# Patient Record
Sex: FEMALE | Race: WHITE | NOT HISPANIC OR LATINO | Employment: FULL TIME | URBAN - METROPOLITAN AREA
[De-identification: names, ages, dates, MRNs, and addresses within clinical notes are randomized per-mention and may not be internally consistent; named-entity substitution may affect disease eponyms.]

---

## 2017-01-20 ENCOUNTER — ALLSCRIPTS OFFICE VISIT (OUTPATIENT)
Dept: OTHER | Facility: OTHER | Age: 47
End: 2017-01-20

## 2017-04-10 ENCOUNTER — ALLSCRIPTS OFFICE VISIT (OUTPATIENT)
Dept: OTHER | Facility: OTHER | Age: 47
End: 2017-04-10

## 2017-06-30 ENCOUNTER — APPOINTMENT (OUTPATIENT)
Dept: LAB | Facility: HOSPITAL | Age: 47
End: 2017-06-30

## 2017-06-30 ENCOUNTER — ALLSCRIPTS OFFICE VISIT (OUTPATIENT)
Dept: OTHER | Facility: OTHER | Age: 47
End: 2017-06-30

## 2017-06-30 DIAGNOSIS — N39.0 URINARY TRACT INFECTION: ICD-10-CM

## 2017-06-30 LAB
BILIRUB UR QL STRIP: NORMAL
CLARITY UR: NORMAL
COLOR UR: YELLOW
GLUCOSE (HISTORICAL): NORMAL
HGB UR QL STRIP.AUTO: NORMAL
KETONES UR STRIP-MCNC: NORMAL MG/DL
LEUKOCYTE ESTERASE UR QL STRIP: NORMAL
NITRITE UR QL STRIP: NORMAL
PH UR STRIP.AUTO: 5 [PH]
PROT UR STRIP-MCNC: NORMAL MG/DL
SP GR UR STRIP.AUTO: 1.02
UROBILINOGEN UR QL STRIP.AUTO: 0.2

## 2017-06-30 PROCEDURE — 87086 URINE CULTURE/COLONY COUNT: CPT

## 2017-07-01 LAB — BACTERIA UR CULT: NORMAL

## 2017-07-02 ENCOUNTER — GENERIC CONVERSION - ENCOUNTER (OUTPATIENT)
Dept: OTHER | Facility: OTHER | Age: 47
End: 2017-07-02

## 2017-08-28 ENCOUNTER — ALLSCRIPTS OFFICE VISIT (OUTPATIENT)
Dept: OTHER | Facility: OTHER | Age: 47
End: 2017-08-28

## 2017-08-28 DIAGNOSIS — E66.8 OTHER OBESITY: ICD-10-CM

## 2017-08-28 DIAGNOSIS — E21.3 HYPERPARATHYROIDISM (HCC): ICD-10-CM

## 2017-08-28 DIAGNOSIS — D50.9 IRON DEFICIENCY ANEMIA: ICD-10-CM

## 2017-08-28 DIAGNOSIS — E55.9 VITAMIN D DEFICIENCY: ICD-10-CM

## 2017-08-28 LAB
BILIRUB UR QL STRIP: NEGATIVE
CLARITY UR: NORMAL
COLOR UR: CLEAR
GLUCOSE (HISTORICAL): NEGATIVE
HGB UR QL STRIP.AUTO: NEGATIVE
KETONES UR STRIP-MCNC: NEGATIVE MG/DL
LEUKOCYTE ESTERASE UR QL STRIP: NEGATIVE
NITRITE UR QL STRIP: NEGATIVE
PH UR STRIP.AUTO: 5 [PH]
PROT UR STRIP-MCNC: NEGATIVE MG/DL
SP GR UR STRIP.AUTO: 1.01
UROBILINOGEN UR QL STRIP.AUTO: 0.2

## 2017-09-29 ENCOUNTER — GENERIC CONVERSION - ENCOUNTER (OUTPATIENT)
Dept: OTHER | Facility: OTHER | Age: 47
End: 2017-09-29

## 2017-11-01 ENCOUNTER — TRANSCRIBE ORDERS (OUTPATIENT)
Dept: LAB | Facility: CLINIC | Age: 47
End: 2017-11-01

## 2017-11-01 ENCOUNTER — APPOINTMENT (OUTPATIENT)
Dept: LAB | Facility: CLINIC | Age: 47
End: 2017-11-01
Payer: COMMERCIAL

## 2017-11-01 DIAGNOSIS — E21.3 HYPERPARATHYROIDISM (HCC): ICD-10-CM

## 2017-11-01 DIAGNOSIS — E66.8 OTHER OBESITY: ICD-10-CM

## 2017-11-01 DIAGNOSIS — Z01.818 PREOP EXAMINATION: Primary | ICD-10-CM

## 2017-11-01 DIAGNOSIS — E55.9 VITAMIN D DEFICIENCY: ICD-10-CM

## 2017-11-01 DIAGNOSIS — Z01.818 PREOP EXAMINATION: ICD-10-CM

## 2017-11-01 DIAGNOSIS — D50.9 IRON DEFICIENCY ANEMIA: ICD-10-CM

## 2017-11-01 LAB
25(OH)D3 SERPL-MCNC: 30.7 NG/ML (ref 30–100)
ALBUMIN SERPL BCP-MCNC: 3.5 G/DL (ref 3.5–5)
ALP SERPL-CCNC: 46 U/L (ref 46–116)
ALT SERPL W P-5'-P-CCNC: 20 U/L (ref 12–78)
ANION GAP SERPL CALCULATED.3IONS-SCNC: 6 MMOL/L (ref 4–13)
AST SERPL W P-5'-P-CCNC: 15 U/L (ref 5–45)
BASOPHILS # BLD AUTO: 0.02 THOUSANDS/ΜL (ref 0–0.1)
BASOPHILS NFR BLD AUTO: 1 % (ref 0–1)
BILIRUB SERPL-MCNC: 1 MG/DL (ref 0.2–1)
BUN SERPL-MCNC: 17 MG/DL (ref 5–25)
CALCIUM SERPL-MCNC: 8.4 MG/DL (ref 8.3–10.1)
CHLORIDE SERPL-SCNC: 107 MMOL/L (ref 100–108)
CHOLEST SERPL-MCNC: 139 MG/DL (ref 50–200)
CO2 SERPL-SCNC: 28 MMOL/L (ref 21–32)
CREAT SERPL-MCNC: 0.77 MG/DL (ref 0.6–1.3)
EOSINOPHIL # BLD AUTO: 0.02 THOUSAND/ΜL (ref 0–0.61)
EOSINOPHIL NFR BLD AUTO: 1 % (ref 0–6)
ERYTHROCYTE [DISTWIDTH] IN BLOOD BY AUTOMATED COUNT: 12.5 % (ref 11.6–15.1)
GFR SERPL CREATININE-BSD FRML MDRD: 92 ML/MIN/1.73SQ M
GLUCOSE P FAST SERPL-MCNC: 95 MG/DL (ref 65–99)
HCT VFR BLD AUTO: 40 % (ref 34.8–46.1)
HDLC SERPL-MCNC: 64 MG/DL (ref 40–60)
HGB BLD-MCNC: 12.8 G/DL (ref 11.5–15.4)
LDLC SERPL CALC-MCNC: 68 MG/DL (ref 0–100)
LYMPHOCYTES # BLD AUTO: 1.11 THOUSANDS/ΜL (ref 0.6–4.47)
LYMPHOCYTES NFR BLD AUTO: 26 % (ref 14–44)
MCH RBC QN AUTO: 30.9 PG (ref 26.8–34.3)
MCHC RBC AUTO-ENTMCNC: 32 G/DL (ref 31.4–37.4)
MCV RBC AUTO: 97 FL (ref 82–98)
MONOCYTES # BLD AUTO: 0.35 THOUSAND/ΜL (ref 0.17–1.22)
MONOCYTES NFR BLD AUTO: 8 % (ref 4–12)
NEUTROPHILS # BLD AUTO: 2.81 THOUSANDS/ΜL (ref 1.85–7.62)
NEUTS SEG NFR BLD AUTO: 64 % (ref 43–75)
PLATELET # BLD AUTO: 259 THOUSANDS/UL (ref 149–390)
PMV BLD AUTO: 9.9 FL (ref 8.9–12.7)
POTASSIUM SERPL-SCNC: 4.5 MMOL/L (ref 3.5–5.3)
PROT SERPL-MCNC: 6.6 G/DL (ref 6.4–8.2)
RBC # BLD AUTO: 4.14 MILLION/UL (ref 3.81–5.12)
SODIUM SERPL-SCNC: 141 MMOL/L (ref 136–145)
TRIGL SERPL-MCNC: 36 MG/DL
TSH SERPL DL<=0.05 MIU/L-ACNC: 1.35 UIU/ML (ref 0.36–3.74)
WBC # BLD AUTO: 4.31 THOUSAND/UL (ref 4.31–10.16)

## 2017-11-01 PROCEDURE — 85025 COMPLETE CBC W/AUTO DIFF WBC: CPT

## 2017-11-01 PROCEDURE — 80061 LIPID PANEL: CPT

## 2017-11-01 PROCEDURE — 84443 ASSAY THYROID STIM HORMONE: CPT

## 2017-11-01 PROCEDURE — 82306 VITAMIN D 25 HYDROXY: CPT

## 2017-11-01 PROCEDURE — 80053 COMPREHEN METABOLIC PANEL: CPT

## 2017-11-01 PROCEDURE — 36415 COLL VENOUS BLD VENIPUNCTURE: CPT

## 2017-11-03 ENCOUNTER — GENERIC CONVERSION - ENCOUNTER (OUTPATIENT)
Dept: OTHER | Facility: OTHER | Age: 47
End: 2017-11-03

## 2017-11-06 RX ORDER — ALPRAZOLAM 0.25 MG/1
TABLET ORAL
COMMUNITY
End: 2018-05-22 | Stop reason: SDUPTHER

## 2017-11-06 RX ORDER — OXYBUTYNIN CHLORIDE 10 MG/1
10 TABLET, EXTENDED RELEASE ORAL
COMMUNITY
End: 2018-06-24 | Stop reason: SDUPTHER

## 2017-11-09 ENCOUNTER — ALLSCRIPTS OFFICE VISIT (OUTPATIENT)
Dept: OTHER | Facility: OTHER | Age: 47
End: 2017-11-09

## 2017-11-09 DIAGNOSIS — Z12.31 ENCOUNTER FOR SCREENING MAMMOGRAM FOR MALIGNANT NEOPLASM OF BREAST: ICD-10-CM

## 2017-11-09 DIAGNOSIS — Z11.3 ENCOUNTER FOR SCREENING FOR INFECTIONS WITH PREDOMINANTLY SEXUAL MODE OF TRANSMISSION: ICD-10-CM

## 2017-11-10 NOTE — PROGRESS NOTES
Assessment    1  Contraception (V25 9) (Z30 9)   2  Encounter for gynecological examination (V72 31) (Z01 419)    Plan  Encounter for screening mammogram for breast cancer    · * MAMMO SCREENING BILATERAL W CAD; Status:Hold For - Scheduling; Requestedfor:09Nov2017;    Perform:[facility] Radiology; TWF:[id number]; Ordered;For:Encounter for screening mammogram for breast cancer; Ordered By:Cynthia Raymond;  Health Maintenance    · Junel FE 1/20 1-20 MG-MCG Oral Tablet; Take 1 tablet daily   Rx By: Isabel Nicole; Dispense: 0 Days ; #:84 Tablet; Refill: 3;Health Maintenance; CASIMIRO = N; Verified Transmission to [facility] #[id number] Last Updated By: System, SureScripts; 11/9/2017 12:25:31 PM    Discussion/Summary  health maintenance visit Currently, she eats a healthy diet  the risks and benefits of cervical cancer screening were discussed cervical cancer screening is current Pap test was done today Breast cancer screening: the risks and benefits of breast cancer screening were discussed and mammogram has been ordered  Colorectal cancer screening: the risks and benefits of colorectal cancer screening were discussed  Normal APE  The patient has the current Goals: Routine exam  The patent has the current Barriers: None  Patient is able to Self-Care  The treatment plan was reviewed with the patient/guardian  The patient/guardian understands and agrees with the treatment plan     Self Referrals: No      Chief Complaint  Annual Gyn Exam      History of Present Illness  HPI: Here for annual gyn exam - overall well - no menses for over 1 year and is taking Junel 1/20 and overall well - bowels are normal - bladder with leakage and has been following with urology and has surgery scheduled for next week - recent blood work and needs mammogram -   GYN HM, Adult Female [facility]: The patient is being seen for a health maintenance and gynecology evaluation  General Health:  The patient's health since the last visit is described as good  She complains of vision problems  -- She denies hearing loss  Immunizations status: up to date  Lifestyle:  She consumes a diverse and healthy diet  -- She does not have any weight concerns  -- She exercises regularly  -- She does not use tobacco -- She denies alcohol use  -- She denies drug use  Reproductive health: the patient is postmenopausal--   she reports no menstrual problems  Menstrual history:  age at menarche was 4  LMP: the last menstrual period was 08/09/2016 -- she uses contraception  For contraception, she has had a tubal occlusion  -- she is not sexually active  She pt requesting STD HIV testing  Screening: cancer screening reviewed and current  metabolic screening reviewed and current  risk screening reviewed and current  Review of Systems   Constitutional: feeling poorly-- and-- feeling tired, but-- no fever,-- no recent weight gain,-- no chills-- and-- no recent weight loss  Cardiovascular: lower extremity edema, but-- the heart rate was not slow,-- no chest pain,-- no intermittent leg claudication,-- the heart rate was not fast-- and-- no palpitations  Respiratory: no complaints of shortness of breath, no wheezing, no dyspnea on exertion, no orthopnea or PND  Breasts: no complaints of breast pain, breast lump or nipple discharge  Gastrointestinal: constipation, but-- no abdominal pain,-- no nausea,-- no vomiting,-- no diarrhea-- and-- no blood in stools  Genitourinary: incontinence-- and-- irregular periods, urinary frequency, but-- no dysuria,-- no pelvic pain,-- no vaginal discharge,-- no dysmenorrhea-- and-- no unexplained vaginal bleeding  Musculoskeletal: no complaints of arthralgia, no myalgia, no joint swelling or stiffness, no limb pain or swelling  Neurological: no complaints of headache, no confusion, no numbness or tingling, no dizziness or fainting  Other Symptoms: visual changes, anxiety, allergy symptoms  ROS reviewed        OB History  Pregnancy History (Brief):  Prior pregnancies: : 2  Para: 2 (living)  Delivery type: 2 vaginal       Active Problems    1  Acne (706 1) (L70 9)   2  Adult BMI > 30 (V85 30)   3  Allergic rhinitis due to pollen (477 0) (J30 1)   4  Anxiety (300 00) (F41 9)   5  Breast cancer screening (V76 10) (Z12 39)   6  Contraception (V25 9) (Z30 9)   7  Encounter for screening mammogram for breast cancer (V76 12) (Z12 31)   8  GERD (gastroesophageal reflux disease) (530 81) (K21 9)   9  Hyperparathyroidism (252 00) (E21 3)   10  Iron deficiency anemia (280 9) (D50 9)   11  Need for influenza vaccination (V04 81) (Z23)   12  Need for Tdap vaccination (V06 1) (Z23)   13  Preop examination (V72 84) (Z01 818)   14  Screening for cervical cancer (V76 2) (Z12 4)   15  Stress incontinence of urine (N39 3)   16  Urge and stress incontinence (788 33) (N39 46)   17  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)   18  Vitamin D deficiency (268 9) (E55 9)    Past Medical History   · History of Acute URI (465 9) (J06 9)   · History of Anemia (285 9) (D64 9)   · History of fatigue (V13 89) (O49 307)   · History of seasonal allergies (V15 09) (Z88 9)   · History of Migraines (346 90) (G43 909)   · Personal history of asthma (V12 69) (Z87 09)   · History of Sore throat (462) (J02 9)   · History of Sorethroat (462) (J02 9)   · History of Weight gain (783 1) (R63 5)    The active problems and past medical history were reviewed and updated today  Surgical History   · History of Stab Phlebectomy Varicose Veins More Than 20 Stab Incisions   · History of Tonsillectomy With Adenoidectomy   · History of Tubal Ligation   · History of Varicose Vein Ligation    The surgical history was reviewed and updated today         Family History  Mother    · Family history of Anxiety and depression   · Family history of malignant neoplasm (V16 9) (Z80 9)   · Family history of Hypothyroidism  Father    · Family history of Anxiety and depression   · Family history of Arthritis   · Family history of COPD (chronic obstructive pulmonary disease)   · Family history of cardiac disorder (V17 49) (Z82 49)   · Family history of diabetes mellitus (V18 0) (Z83 3)   · Family history of hypertension (V17 49) (Z82 49)   · Family history of malignant neoplasm (V16 9) (Z80 9)  Aunt    · Family history of malignant neoplasm (V16 9) (Z80 9)  Maternal Aunt    · Family history of malignant neoplasm of breast (V16 3) (Z80 3)    The family history was reviewed and updated today  Social History     · Caffeine use (V49 89) (F15 90)   · Former smoker (E12 31) (U67 248)   · Never a smoker   · No alcohol use   · No drug use   · Single  The social history was reviewed and updated today  Current Meds   1  Align Oral Capsule; TAKE 1 CAPSULE ORALLY DAILY; Therapy: 56FSB6917 to (Evaluate:17Cwx0987) Recorded   2  ALPRAZolam 0 25 MG Oral Tablet; TAKE 1 TABLET AT BEDTIME AS NEEDED; Therapy: 18IRY9559 to (Evaluate:14Nov2017); Last Rx:93Obu3830 Ordered   3  Hair Skin & Nails Gummies 1250-7 5-7 5 MCG-MG-UNT Oral Tablet Chewable; Take 1 daily; Therapy: 12HNC1638 to Recorded   4  Junel FE 1/20 1-20 MG-MCG Oral Tablet; take 1 tablet every day; Therapy: 18FXU4369 to (Evaluate:05Ome3214)  Requested for: 13MTN1182; Last Rx:92Lxp5524 Ordered   5  Omega 3 1000 MG Oral Capsule; TAKE 1 TABLET IN THE MORNING; Therapy: 33YYN4079 to Recorded   6  One-A-Day Womens TABS; Therapy: (Florentin Langston) to Recorded   7  Oxybutynin Chloride ER 10 MG Oral Tablet Extended Release 24 Hour; TAKE ONE TABLET BY MOUTH ONCE DAILY; Therapy: 12Jyr2074 to (Evaluate:20Sev7124)  Requested for: 28Dpa2479; Last Rx:84Djo1518 Ordered   8  Pepcid 20 MG Oral Tablet; TAKE 1 TABLET DAILY AS DIRECTED; Therapy: 87HWD6787 to Recorded   9  Vitamin B Complex Oral Tablet; TAKE 1 TABLET DAILY; Therapy: 12POX1291 to (Evaluate:80Uku7573) Recorded   10  Vitamin C Gummie 120 MG Oral Tablet Chewable; Take 1 daily;   Therapy: 94TGC3036 to Recorded   11  ZyrTEC Allergy 10 MG Oral Tablet; take 1 tablet daily as needed; Therapy: 82LFF6552 to Recorded    Allergies  1  Sulfa Drugs   2  Acetaminophen CAPS   3  Codeine Sulfate TABS   4  Latex Exam Gloves MISC   5  Penicillins   6  Morphine Derivatives  7  Mold   8  No Known Food Allergies   9  Seasonal    Vitals   Recorded: 18YAL1223 78:71IH   Systolic 715, RUE, Sitting   Diastolic 82, RUE, Sitting   Height 5 ft 5 in   Weight 176 lb    BMI Calculated 29 29   BSA Calculated 1 87       Physical Exam   Constitutional  General appearance: No acute distress, well appearing and well nourished  -- well nourished white female  Neck  Neck: Normal, supple, trachea midline, no masses  Thyroid: Normal, no thyromegaly  Pulmonary  Respiratory effort: No increased work of breathing or signs of respiratory distress  Auscultation of lungs: Clear to auscultation  Cardiovascular  Auscultation of heart: Normal rate and rhythm, normal S1 and S2, no murmurs  Peripheral vascular exam: Normal pulses Throughout  Genitourinary  External genitalia: Normal and no lesions appreciated  Vagina: Normal, no lesions or dryness appreciated  Urethra: Normal    Urethral meatus: Normal    Bladder: Normal, soft, non-tender and no prolapse or masses appreciated  Cervix: Normal, no palpable masses  Uterus: Normal, non-tender, not enlarged, and no palpable masses  Adnexa/parametria: Normal, non-tender and no fullness or masses appreciated  Anus, perineum, and rectum: Normal sphincter tone, no masses, and no prolapse  Chest  Breasts: Normal and no dimpling or skin changes noted  Abdomen  Abdomen: Normal, non-tender, and no organomegaly noted  Liver and spleen: No hepatomegaly or splenomegaly  Examination for hernias: No hernias appreciated  Stool sample for occult blood: Negative  Lymphatic  Palpation of lymph nodes in neck, axillae, groin and/or other locations: No lymphadenopathy or masses noted     Skin  Skin and subcutaneous tissue: Normal skin turgor and no rashes     Palpation of skin and subcutaneous tissue: Normal    Psychiatric  Orientation to person, place, and time: Normal    Mood and affect: Normal        Signatures   Electronically signed by : MELANIE Bowie ; Nov 9 2017 12:32PM EST                       (Author)

## 2017-11-12 ENCOUNTER — ANESTHESIA EVENT (OUTPATIENT)
Dept: PERIOP | Facility: HOSPITAL | Age: 47
End: 2017-11-12
Payer: COMMERCIAL

## 2017-11-12 LAB
CHLAMYDIA, LIQUID-BASED (HISTORICAL): NOT DETECTED
GC BY MOL. METHOD (HISTORICAL): NOT DETECTED
HPV 18 (HISTORICAL): NOT DETECTED
HPV HIGH RISK 16/18 (HISTORICAL): NOT DETECTED
HPV16 (HISTORICAL): NOT DETECTED
PAP (HISTORICAL): NORMAL

## 2017-11-13 ENCOUNTER — ANESTHESIA (OUTPATIENT)
Dept: PERIOP | Facility: HOSPITAL | Age: 47
End: 2017-11-13
Payer: COMMERCIAL

## 2017-11-13 ENCOUNTER — HOSPITAL ENCOUNTER (OUTPATIENT)
Facility: HOSPITAL | Age: 47
Setting detail: OUTPATIENT SURGERY
Discharge: HOME | End: 2017-11-13
Attending: UROLOGY | Admitting: UROLOGY
Payer: COMMERCIAL

## 2017-11-13 VITALS
DIASTOLIC BLOOD PRESSURE: 65 MMHG | WEIGHT: 175 LBS | HEIGHT: 65 IN | BODY MASS INDEX: 29.16 KG/M2 | SYSTOLIC BLOOD PRESSURE: 126 MMHG | HEART RATE: 62 BPM | RESPIRATION RATE: 18 BRPM | OXYGEN SATURATION: 98 % | TEMPERATURE: 100 F

## 2017-11-13 DIAGNOSIS — R32 URINARY INCONTINENCE: ICD-10-CM

## 2017-11-13 PROCEDURE — C1771 REP DEV, URINARY, W/SLING: HCPCS | Performed by: UROLOGY

## 2017-11-13 PROCEDURE — 87086 URINE CULTURE/COLONY COUNT: CPT | Performed by: UROLOGY

## 2017-11-13 DEVICE — SLING TVT ABBREVO MINI: Type: IMPLANTABLE DEVICE | Site: BLADDER | Status: FUNCTIONAL

## 2017-11-13 RX ORDER — SODIUM CHLORIDE, SODIUM LACTATE, POTASSIUM CHLORIDE, CALCIUM CHLORIDE 600; 310; 30; 20 MG/100ML; MG/100ML; MG/100ML; MG/100ML
20 INJECTION, SOLUTION INTRAVENOUS CONTINUOUS
Status: DISCONTINUED | OUTPATIENT
Start: 2017-11-13 | End: 2017-11-14 | Stop reason: HOSPADM

## 2017-11-13 RX ORDER — LIDOCAINE HYDROCHLORIDE 10 MG/ML
INJECTION, SOLUTION INFILTRATION; PERINEURAL AS NEEDED
Status: DISCONTINUED | OUTPATIENT
Start: 2017-11-13 | End: 2017-11-13 | Stop reason: SURG

## 2017-11-13 RX ORDER — CIPROFLOXACIN 2 MG/ML
400 INJECTION, SOLUTION INTRAVENOUS ONCE
Status: COMPLETED | OUTPATIENT
Start: 2017-11-13 | End: 2017-11-13

## 2017-11-13 RX ORDER — MAGNESIUM HYDROXIDE 1200 MG/15ML
LIQUID ORAL AS NEEDED
Status: DISCONTINUED | OUTPATIENT
Start: 2017-11-13 | End: 2017-11-13 | Stop reason: HOSPADM

## 2017-11-13 RX ORDER — MIDAZOLAM HYDROCHLORIDE 1 MG/ML
INJECTION INTRAMUSCULAR; INTRAVENOUS AS NEEDED
Status: DISCONTINUED | OUTPATIENT
Start: 2017-11-13 | End: 2017-11-13 | Stop reason: SURG

## 2017-11-13 RX ORDER — ONDANSETRON 2 MG/ML
INJECTION INTRAMUSCULAR; INTRAVENOUS AS NEEDED
Status: DISCONTINUED | OUTPATIENT
Start: 2017-11-13 | End: 2017-11-13 | Stop reason: SURG

## 2017-11-13 RX ORDER — PROPOFOL 10 MG/ML
INJECTION, EMULSION INTRAVENOUS AS NEEDED
Status: DISCONTINUED | OUTPATIENT
Start: 2017-11-13 | End: 2017-11-13 | Stop reason: SURG

## 2017-11-13 RX ORDER — METRONIDAZOLE 7.5 MG/G
GEL VAGINAL AS NEEDED
Status: DISCONTINUED | OUTPATIENT
Start: 2017-11-13 | End: 2017-11-13 | Stop reason: HOSPADM

## 2017-11-13 RX ORDER — ONDANSETRON 2 MG/ML
4 INJECTION INTRAMUSCULAR; INTRAVENOUS ONCE AS NEEDED
Status: DISCONTINUED | OUTPATIENT
Start: 2017-11-13 | End: 2017-11-13 | Stop reason: HOSPADM

## 2017-11-13 RX ORDER — TRAMADOL HYDROCHLORIDE 50 MG/1
50 TABLET ORAL EVERY 6 HOURS PRN
Status: DISCONTINUED | OUTPATIENT
Start: 2017-11-13 | End: 2017-11-14 | Stop reason: HOSPADM

## 2017-11-13 RX ORDER — MIDAZOLAM HYDROCHLORIDE 1 MG/ML
INJECTION INTRAMUSCULAR; INTRAVENOUS
Status: DISCONTINUED
Start: 2017-11-13 | End: 2017-11-14 | Stop reason: HOSPADM

## 2017-11-13 RX ORDER — FENTANYL CITRATE 50 UG/ML
INJECTION, SOLUTION INTRAMUSCULAR; INTRAVENOUS AS NEEDED
Status: DISCONTINUED | OUTPATIENT
Start: 2017-11-13 | End: 2017-11-13 | Stop reason: SURG

## 2017-11-13 RX ORDER — TRAMADOL HYDROCHLORIDE 50 MG/1
50 TABLET ORAL EVERY 4 HOURS PRN
Qty: 12 TABLET | Refills: 0 | Status: SHIPPED | OUTPATIENT
Start: 2017-11-13 | End: 2017-11-18

## 2017-11-13 RX ORDER — FENTANYL CITRATE 50 UG/ML
INJECTION, SOLUTION INTRAMUSCULAR; INTRAVENOUS
Status: DISCONTINUED
Start: 2017-11-13 | End: 2017-11-14 | Stop reason: HOSPADM

## 2017-11-13 RX ORDER — FENTANYL CITRATE/PF 50 MCG/ML
25 SYRINGE (ML) INJECTION
Status: DISCONTINUED | OUTPATIENT
Start: 2017-11-13 | End: 2017-11-13 | Stop reason: HOSPADM

## 2017-11-13 RX ORDER — SODIUM CHLORIDE, SODIUM LACTATE, POTASSIUM CHLORIDE, CALCIUM CHLORIDE 600; 310; 30; 20 MG/100ML; MG/100ML; MG/100ML; MG/100ML
125 INJECTION, SOLUTION INTRAVENOUS CONTINUOUS
Status: DISCONTINUED | OUTPATIENT
Start: 2017-11-13 | End: 2017-11-14 | Stop reason: HOSPADM

## 2017-11-13 RX ADMIN — ONDANSETRON 4 MG: 2 INJECTION INTRAMUSCULAR; INTRAVENOUS at 17:08

## 2017-11-13 RX ADMIN — SODIUM CHLORIDE, SODIUM LACTATE, POTASSIUM CHLORIDE, AND CALCIUM CHLORIDE: .6; .31; .03; .02 INJECTION, SOLUTION INTRAVENOUS at 16:48

## 2017-11-13 RX ADMIN — CIPROFLOXACIN: 2 INJECTION INTRAVENOUS at 17:08

## 2017-11-13 RX ADMIN — FENTANYL CITRATE 50 MCG: 50 INJECTION, SOLUTION INTRAMUSCULAR; INTRAVENOUS at 16:57

## 2017-11-13 RX ADMIN — MIDAZOLAM HYDROCHLORIDE 2 MG: 1 INJECTION, SOLUTION INTRAMUSCULAR; INTRAVENOUS at 17:01

## 2017-11-13 RX ADMIN — PROPOFOL 200 MG: 10 INJECTION, EMULSION INTRAVENOUS at 17:04

## 2017-11-13 RX ADMIN — FENTANYL CITRATE 50 MCG: 50 INJECTION, SOLUTION INTRAMUSCULAR; INTRAVENOUS at 17:08

## 2017-11-13 RX ADMIN — DEXAMETHASONE SODIUM PHOSPHATE 10 MG: 10 INJECTION INTRAMUSCULAR; INTRAVENOUS at 17:08

## 2017-11-13 RX ADMIN — SODIUM CHLORIDE, SODIUM LACTATE, POTASSIUM CHLORIDE, AND CALCIUM CHLORIDE 1000 ML/HR: .6; .31; .03; .02 INJECTION, SOLUTION INTRAVENOUS at 21:30

## 2017-11-13 RX ADMIN — LIDOCAINE HYDROCHLORIDE 50 MG: 10 INJECTION, SOLUTION INFILTRATION; PERINEURAL at 17:04

## 2017-11-13 RX ADMIN — HYDROMORPHONE HYDROCHLORIDE 1 MG: 1 INJECTION, SOLUTION INTRAMUSCULAR; INTRAVENOUS; SUBCUTANEOUS at 17:38

## 2017-11-13 NOTE — ANESTHESIA PREPROCEDURE EVALUATION
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, exercises on treadmill at gym without cardiopulmonary limitation    Pulmonary  Negative pulmonary ROS Not a smoker , ,        GI/Hepatic    GERD well controlled,          Comment: Urge/stress incontinence     Endo/Other  Parathyroid disease hyperparathyroidism,      GYN      Comment: Status post tubal ligation     Hematology  Negative hematology ROS      Musculoskeletal  Negative musculoskeletal ROS        Neurology    Headaches,    Psychology   Anxiety,            Physical Exam    Airway    Mallampati score: II  TM Distance: >3 FB  Neck ROM: full     Dental   Comment: No loose teeth, No notable dental hx     Cardiovascular  Comment: Negative ROS, Rhythm: regular, No murmur,     Pulmonary  Breath sounds clear to auscultation,     Other Findings        Anesthesia Plan  ASA Score- 2       Anesthesia Type- general with ASA Monitors  Additional Monitors:   Airway Plan:           Induction- intravenous  Informed Consent- Anesthetic plan and risks discussed with patient

## 2017-11-13 NOTE — OP NOTE
OPERATIVE REPORT  PATIENT NAME: Birgit Bliss    :  1970  MRN: [id number]  Pt Location:  OR ROOM 03    SURGERY DATE: 2017    Surgeon(s) and Role:     * Seamus Kerns MD - Primary    Preop Diagnosis:  Urinary incontinence [R32] Stress incontinence    Post-Op Diagnosis Codes:     * Urinary incontinence [R32]    Procedure(s) (LRB):  TRANSVAGINAL OBTURATOR BLADDER SLING (N/A)  CYSTOSCOPY (N/A)    Specimen(s):  ID Type Source Tests Collected by Time Destination   A :  Urine Urine, Cystoscopic URINE CULTURE Seamus Kerns MD 2017 [id number]        Estimated Blood Loss:   Minimal    Drains:       Anesthesia Type:   General    Operative Indications:  Urinary incontinence [R32]  Stress incontinence    Operative Findings:  Hypermobile bladder neck    Complications:   None    Procedure and Technique:  The patient presents to the office for urinary incontinence primarily stress type with some urgency frequency as well  Examination reveals hyper mobile bladder neck  Options of management were discussed  She wished to proceed with a sub urethral obturator sling  She is brought to the operating room identified transferred to the OR table  General anesthesia was then administered  After adequate anesthesia patient placed in lithotomy position genitalia prepped with Betadine and she was draped  Lowe catheter was inserted culture specimen obtained  The bladder neck was marked in the vaginal anterior wall  10 mL of 1% lidocaine with epinephrine was then placed into the suburethral vaginal mucosa  A vertical incision was then made beneath the urethra to the ignacia of the bladder neck  This was then dissected bluntly with the Metzenbaum scissors  Dissection was carried out to the to the obturator membrane and not punctured  After freeing the tissue adequately on both right and left sides  She had previously been marked on the skin surface as well    Using the [facility] of sling, the obturator membrane was punctured with the device and passed through right and left sides  The midline sling marked with the ring was in position  The sling was tightened over the 18 Lowe catheter in place  At this time, cystoscopy was performed showing no puncture of the urethra or the bladder neck  Final position was made in the mesh sling  And the sutures were all removed  There was minimal bleeding  The vaginal incision was then closed with a 2-0 Vicryl suture  The vagina was packed with a vaginal packing with MetroGel mix  She tolerated this well  Awakened without incident  The incisions over the exit wounds were closed with histacryl    She was awakened and transferred to PACU in good condition  I was present for the entire procedure    Patient Disposition:  PACU     SIGNATURE: Eladia Reeves MD  DATE: November 13, 2017  TIME: 6:25 PM

## 2017-11-13 NOTE — ANESTHESIA POSTPROCEDURE EVALUATION
Post-Op Assessment Note      CV Status:  Stable    Mental Status:  Alert and awake    Hydration Status:  Euvolemic    PONV Controlled:  Controlled    Airway Patency:  Patent    Post Op Vitals Reviewed: Yes          Staff: CRNA           /70 (11/13/17 1815)    Temp 98 6 °F (37 °C) (11/13/17 1815)    Pulse 76 (11/13/17 1815)   Resp 16 (11/13/17 1815)    SpO2 98 % (11/13/17 1815)

## 2017-11-14 NOTE — DISCHARGE INSTRUCTIONS
Bladder Sling Procedure   WHAT YOU NEED TO KNOW:   A bladder sling procedure is surgery to treat urinary incontinence in women  The sling acts as a hammock to keep your urethra in place and hold it closed when your bladder is full  You may have vaginal bleeding or discharge for up to a week after your surgery  Use sanitary pads  Do not use tampons  You may have some pelvic discomfort or trouble urinating  DISCHARGE INSTRUCTIONS:   Call 911 for any of the following:   · You have sudden trouble breathing  Seek care immediately if:   · Your bleeding gets worse  · You have yellow or foul smelling discharge from your vagina  · You cannot urinate, or you are urinating less than what is normal for you  · You feel confused  Contact your healthcare provider if:   · You have a fever  · You do not feel like you are able to empty your bladder completely when you urinate  · You feel the need to urinate very suddenly  · You have burning or stinging when you urinate  · You have blood in your urine  · Your skin is itchy, swollen, or you have a rash  · You have questions or concerns about your condition or care  Medicines:   · Prescription pain medicine  may be given  Ask your how to take this medicine safely  · Take your medicine as directed  Contact your healthcare provider if you think your medicine is not helping or if you have side effects  Tell him or her if you are allergic to any medicine  Keep a list of the medicines, vitamins, and herbs you take  Include the amounts, and when and why you take them  Bring the list or the pill bottles to follow-up visits  Carry your medicine list with you in case of an emergency  Self-catheterization:  You may need to put a catheter into your bladder after you urinate to empty any remaining urine  A catheter is a small rubber tube used to drain urine  Healthcare providers will teach you how to put the catheter in safely   This may be needed until you are completely emptying your bladder  Lowe catheter: You may have a Lowe catheter for a short period of time  The Lowe is a tube put into your bladder to drain urine into a bag  Keep the bag below your waist  This will prevent urine from flowing back into your bladder and causing an infection or other problems  Also, keep the tube free of kinks so the urine will drain properly  Do not pull on the catheter  This can cause pain and bleeding, and may cause the catheter to come out  Activity:  Do not lift heavy objects for 6 weeks after your procedure  Do not have intercourse for 4 to 6 weeks  Do not use a tampon for 4 weeks  Ask your healthcare provider when you can return to work or your usual activities  Do pelvic muscle exercises: These are also called Kegel exercises  These exercises help strengthen your pelvic muscles and help prevent urine leakage  Tighten the muscles of your pelvis and hold them tight for 5 seconds, then relax for 5 seconds  Gradually work up to tightening them for 10 seconds and relaxing for 10 seconds  Do this 3 times each day  Keep a record:  Keep a record of when you urinate and if you leak any urine  Write down what you were doing when you leaked urine, such as coughing or sneezing  Bring the log to your follow-up visits  Prevent constipation:  Drink liquids as directed  You may need to drink more water than usual to soften your bowel movements  Eat a variety of healthy foods, especially fruit and foods high in fiber  You may need to use an over-the-counter bowel movement softener  Follow up with your healthcare provider as directed: You may need a test to check how much urine remains in your bladder after you urinate  This will help show how the sling is working  Write down your questions so you remember to ask them during your visits    © 2017 [employer] Information is for End User's use only and may not be sold, redistributed or otherwise used for commercial purposes  All illustrations and images included in CareNotes® are the copyrighted property of A D A M , Inc  or Napoleon Herrera  The above information is an  only  It is not intended as medical advice for individual conditions or treatments  Talk to your doctor, nurse or pharmacist before following any medical regimen to see if it is safe and effective for you

## 2017-11-15 LAB — BACTERIA UR CULT: NORMAL

## 2017-12-08 ENCOUNTER — GENERIC CONVERSION - ENCOUNTER (OUTPATIENT)
Dept: FAMILY MEDICINE CLINIC | Facility: CLINIC | Age: 47
End: 2017-12-08

## 2018-01-09 NOTE — MISCELLANEOUS
Message  rec'd a call from the pt stating that her dressing rolled down and one of her stab sites are bleeding slightly  She was told to call here  I s/w Lindy and she said to hold pressure and re wrap  Pt aware  She also said she can feel her heartbeat in her head  She was told to rest  Maybe due to anesthesia and pain meds  Active Problems    1  Acne (706 1) (L70 9)   2  Adult BMI > 30 (V85 30) (E66 8)   3  Allergic rhinitis due to pollen (477 0) (J30 1)   4  Anxiety (300 00) (F41 9)   5  Breast cancer screening (V76 10) (Z12 39)   6  Ecchymoses, spontaneous (782 7) (R23 3)   7  Encounter for screening mammogram for breast cancer (V76 12) (Z12 31)   8  GERD (gastroesophageal reflux disease) (530 81) (K21 9)   9  Hyperparathyroidism (252 00) (E21 3)   10  Hyperprolactinemia (253 1) (E22 1)   11  Iron deficiency anemia (280 9) (D50 9)   12  Need for influenza vaccination (V04 81) (Z23)   13  Need for Tdap vaccination (V06 1) (Z23)   14  Preop examination (V72 84) (Z01 818)   15  Urge and stress incontinence (788 33) (N39 46)   16  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 8) (I83 813)   17  Vitamin D deficiency (268 9) (E55 9)    Current Meds   1  ALPRAZolam 0 25 MG Oral Tablet; TAKE 1 TABLET AT BEDTIME AS NEEDED; Therapy: 65QVQ6242 to (Evaluate:82Qwm7762); Last Rx:16Jun2016 Ordered   2  Junel FE 1/20 1-20 MG-MCG Oral Tablet; Therapy: (Anna Alvarez) to Recorded   3  Omeprazole 20 MG Oral Capsule Delayed Release; TAKE 1 CAPSULE DAILY EVERY   MORNING BEFORE BREAKFAST; Therapy: 21Jan2016 to (Evaluate:11Jun2017)  Requested for: 15ARI4552; Last   Rx:16Jun2016 Ordered   4  One-A-Day Womens TABS; Therapy: (73 811 282) to Recorded   5  Pepcid 20 MG Oral Tablet (Famotidine); TAKE 1 TABLET DAILY AS DIRECTED; Therapy: 99HIX4370 to Recorded   6  ZyrTEC Allergy 10 MG Oral Tablet; take 1 tablet daily as needed; Therapy: 80LCK6129 to Recorded    Allergies    1  Sulfa Drugs   2  Acetaminophen CAPS   3  Codeine Sulfate TABS   4  Latex Exam Gloves Miscellaneous   5  Penicillins   6  Morphine Derivatives    7  Mold   8  No Known Food Allergies   9   Seasonal    Signatures   Electronically signed by : Ulices Cardenas, ; Jun 17 2016  2:54PM EST                       (Author)

## 2018-01-10 NOTE — RESULT NOTES
Verified Results  (1) CBC/PLT/DIFF 35POO9283 11:22AM Sly Bloomdeidra Order Number: [id number]  TW Order Number: [id number]     Test Name Result Flag Reference   WBC COUNT 5 27 Thousand/uL  4 31-10 16   RBC COUNT 4 02 Million/uL  3 81-5 12   HEMOGLOBIN 11 9 g/dL  11 5-15 4   HEMATOCRIT 37 2 %  34 8-46  1   MCV 93 fL  82-98   MCH 29 6 pg  26 8-34 3   MCHC 32 0 g/dL  31 4-37 4   RDW 13 8 %  11 6-15 1   MPV 10 4 fL  8 9-12 7   PLATELET COUNT 806 Thousands/uL  149-390   NEUTROPHILS RELATIVE PERCENT 59 %  43-75   LYMPHOCYTES RELATIVE PERCENT 32 %  14-44   MONOCYTES RELATIVE PERCENT 8 %  4-12   EOSINOPHILS RELATIVE PERCENT 1 %  0-6   BASOPHILS RELATIVE PERCENT 0 %  0-1   NEUTROPHILS ABSOLUTE COUNT 3 13 Thousands/?L  1 85-7 62   LYMPHOCYTES ABSOLUTE COUNT 1 68 Thousands/?L  0 60-4 47   MONOCYTES ABSOLUTE COUNT 0 41 Thousand/?L  0 17-1 22   EOSINOPHILS ABSOLUTE COUNT 0 03 Thousand/?L  0 00-0 61   BASOPHILS ABSOLUTE COUNT 0 02 Thousands/?L  0 00-0 10     (1) VITAMIN D 25-HYDROXY 13JYZ7669 11:22AM Sly Bebeto Order Number: [id number]  TW Order Number: [id number]     Test Name Result Flag Reference   VIT D 25-HYDROX 27 3 ng/mL L 30 0-100 0       Discussion/Summary   BLOOD COUNT IS GOOD  VITAMIN D LEVEL IS STILL A LITTLE LOW     Luigi Jimenez

## 2018-01-10 NOTE — MISCELLANEOUS
Message  pt called to confirm info re: upcoming surgery 6/17/16  she thought she was to come in 6/20 for dressing removal but then John told her she would not need to come back for two weeks and she is to remove her own dressing  explained the protocol had changed but has since been revised  she does indeed have an ov 6/20 at 1045 at t for dressing removal  per John when d/c that info along w/ doppler apt will be provided to her  pt also ? if she will have anesth consult, Dr Radha Velasquez said she would but John said she does not need pat's  she is sched for h&p w/ pcp the day prior to surgery  explained she will meet w/ anesth am of surgery  confirmed w/ Gin Tena per e pre op she does not require any testing  since pcp doing h&p she will not need to go in for pat's  pt verb understanding of same, i req she call back should she have any other ?/concerns  Active Problems    1  Acne (706 1) (L70 9)   2  Adult BMI > 30 (V85 30) (E66 8)   3  Allergic rhinitis due to pollen (477 0) (J30 1)   4  Anxiety (300 00) (F41 9)   5  Ecchymoses, spontaneous (782 7) (R23 3)   6  Fatigue (780 79) (R53 83)   7  GERD (gastroesophageal reflux disease) (530 81) (K21 9)   8  Hyperparathyroidism (252 00) (E21 3)   9  Hyperprolactinemia (253 1) (E22 1)   10  Iron deficiency anemia (280 9) (D50 9)   11  Sore throat (462) (J02 9)   12  Sorethroat (462) (J02 9)   13  Urge and stress incontinence (788 33) (N39 46)   14  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 8) (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 813)   15  Vitamin D deficiency (268 9) (E55 9)   16  Weight gain (783 1) (R63 5)    Current Meds   1  Junel FE 1/20 1-20 MG-MCG Oral Tablet; Therapy: (Martínez Goyal) to Recorded   2  Omeprazole 20 MG Oral Capsule Delayed Release; TAKE 1 CAPSULE DAILY EVERY   MORNING BEFORE BREAKFAST; Therapy: 21Jan2016 to (Evaluate:20Apr2016)  Requested for: 22Apr2016; Last   Rx:21Jan2016; Status: ACTIVE - Renewal Denied Ordered   3   Omeprazole 40 MG Oral Capsule Delayed Release; TAKE ONE CAPSULE BY MOUTH   EVERY DAY; Therapy: 30Apr2015 to (Last Rx:[id number])  Requested for: 21Jan2016 Ordered   4  One-A-Day Womens TABS; Therapy: ([phone]) to Recorded   5  Vitamin B-12 TABS; Therapy: (38 [phone]) to Recorded   6  ZyrTEC Allergy 10 MG Oral Tablet; take 1 tablet daily as needed; Therapy: [id number] to Recorded    Allergies    1  Sulfa Drugs   2  Acetaminophen CAPS   3  Codeine Sulfate TABS   4  Penicillins   5  Morphine Derivatives    6  Mold   7  No Known Food Allergies   8   Seasonal    Signatures   Electronically signed by : Jcarlos Nicole, ; Jun 6 2016  9:51AM EST                       (Author)

## 2018-01-11 NOTE — PROGRESS NOTES
History of Present Illness  HPI: Ivan Major with recent sickness and recent dining out- feel like she is retaining fluid  She lost 2 8# in the past week and 40 8# overall  She stated she had lost more but with weekend triggers she dined out a lot with family this weekned  Food journaling an average 0182-8272 on week days  Feels she is retaining fluid in her leg - venous insufficiency  We reviewed dining out tips to reduce calories and sodium content  We reviewed dinner meal planning tips as well  Bariatric MNT [facility]:   Weight Assessment: IBW: 125#, Goal Weight: 155-165#  Excess calories come from in between meal snacking, large portions at mealtimes and high calorie high fat food choices  Dietary Recall:   Breakfast: Pudding / Shake  Snack: mixed nuts 1/4 cup  Lunch: Shake  Snack: string cheese  [employer]  Dinner: Lean Cuisine  Snack: 1 cup 2% milk   Slim fast rice cake 100 calories   Beverages: water  She dines out 3-4 times per week  Exercise Frequency:  She exercises [employer] 3 x around track / gym 4-5 x gym - 12,000-15,000  Nutrition Prescription: Estimated calories for weight loss: Tanita 1313 x 1 3 -750 = 1087 Ecal [id number] Weight loss range: 2895-4486 calories, Estimated daily protein needs: 68-85gm ( 1 2-1 5 gm/kg IBW) and Estimated daily fluid needs: 66 oz ( 35ml/kg IBW)  Nutrition Intervention: Counseling provided with emphasis on meal planning, portion sizes and healthy snack choices  Education materials provided: [employer] manual, calorie controlled menus, low carbohydrate meal planning, lean protein food choices and food journal tips  Goals: follow meal plan prescribed, reduce portion sizes at mealtimes, plan meals and snacks daily, Choose lower-calorie, lower-fat meal options at home and when dining out, food journal, do not skip meals or snacks and 1200 calories using 2 meal replacements can flex to 1400 calories on hihg exercise day     Monitor/Evaluation: weekly weight, food journal, fluid intake and exercise level  Active Problems    1  Acne (706 1) (L70 9)   2  Adult BMI > 30 (V85 30) (E66 8)   3  Allergic rhinitis due to pollen (477 0) (J30 1)   4  Anxiety (300 00) (F41 9)   5  Ecchymoses, spontaneous (782 7) (R23 3)   6  Fatigue (780 79) (R53 83)   7  GERD (gastroesophageal reflux disease) (530 81) (K21 9)   8  Hyperparathyroidism (252 00) (E21 3)   9  Hyperprolactinemia (253 1) (E22 1)   10  Iron deficiency anemia (280 9) (D50 9)   11  Sore throat (462) (J02 9)   12  Sorethroat (462) (J02 9)   13  Urge and stress incontinence (788 33) (N39 46)   14  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 8) (I83 813)   15  Vitamin D deficiency (268 9) (E55 9)   16  Weight gain (783 1) (R63 5)    Past Medical History    1  History of Anemia (285 9) (D64 9)   2  History of seasonal allergies (V15 09) (Z88 9)   3  History of Migraines (346 90) (G43 909)   4  Personal history of asthma (V12 69) (Z87 09)    Surgical History    1  History of Tonsillectomy With Adenoidectomy   2  History of Tubal Ligation   3  History of Varicose Vein Ligation    Family History  Mother    1  Family history of Anxiety and depression   2  Family history of malignant neoplasm (V16 9) (Z80 9)   3  Family history of Hypothyroidism  Father    4  Family history of Anxiety and depression   5  Family history of Arthritis   6  Family history of COPD (chronic obstructive pulmonary disease)   7  Family history of cardiac disorder (V17 49) (Z82 49)   8  Family history of diabetes mellitus (V18 0) (Z83 3)   9  Family history of hypertension (V17 49) (Z82 49)   10  Family history of malignant neoplasm (V16 9) (Z80 9)  Aunt    11  Family history of malignant neoplasm (V16 9) (Z80 9)  Maternal Aunt    12   Family history of malignant neoplasm of breast (V16 3) (Z80 3)    Social History    · Caffeine use (V49 89) (F15 90)   · Former smoker (A65 91) (Z39 261)   · Never a smoker   · No alcohol use   · No drug use    Current Meds   1  Junel FE 1/20 1-20 MG-MCG Oral Tablet; Therapy: (Hina Swenson) to Recorded   2  Omeprazole 20 MG Oral Capsule Delayed Release; TAKE 1 CAPSULE DAILY EVERY   MORNING BEFORE BREAKFAST; Therapy: 21Jan2016 to (Evaluate:20Apr2016)  Requested for: 22Apr2016; Last   Rx:21Jan2016; Status: ACTIVE - Renewal Denied Ordered   3  Omeprazole 40 MG Oral Capsule Delayed Release; TAKE ONE CAPSULE BY MOUTH   EVERY DAY; Therapy: 30Apr2015 to (Last Rx:[id number])  Requested for: 21Jan2016 Ordered   4  One-A-Day Womens TABS; Therapy: [phone]) to Recorded   5  Vitamin B-12 TABS; Therapy: [phone]) to Recorded   6  ZyrTEC Allergy 10 MG Oral Tablet; take 1 tablet daily as needed; Therapy: [id number] to Recorded    Allergies    1  Sulfa Drugs   2  Acetaminophen CAPS   3  Codeine Sulfate TABS   4  Penicillins   5  Morphine Derivatives    6  Mold   7  No Known Food Allergies   8   Seasonal    Vitals  Signs [Data Includes: Current Encounter]   Recorded: 16RYL8746 10:21AM   Height: 5 ft 5 in  Weight: 180 lb 3 2 oz  BMI Calculated: 29 99  BSA Calculated: 1 89    Future Appointments    Date/Time Provider Specialty Site   05/10/2016 10:00 AM [facility]   05/24/2016 09:00 AM [facility] [place]     Signatures   Electronically signed by : Enedina Umaña, ; Apr 26 2016 10:24AM EST                       (Author)    Electronically signed by : MELANIE Henderson ; Apr 26 2016 11:48AM EST                       (Co-author)

## 2018-01-11 NOTE — PROGRESS NOTES
Preliminary Nursing Report                Patient Information    Initial Encounter Entry Date:   2016 11:25 AM EST (Automated Transmission Automated Transmission)       Last Modified:   {Renata Holly}              Legal Name: Paola Jensen        Social Security Number:        YOB: 1970        Age (years): 54        Gender: F        Body Mass Index (BMI): 30 kg/m2        Height: 65 in  Weight: 179 lbs (81 kgs)           Address:   [address] [place] [id number] [place]              Phone: [phone]   (consent to leave messages)        Email:        Ethnicity: Decline to State        Moravian:        Marital Status:        Preferred Language: English        Race: Other Race                    Patient Insurance Information        Primary Insurance Information Carrier Name: {Primary  CarrierName}           Carrier Address:   {Primary  CarrierAddress}              Carrier Phone: {Primary  CarrierPhone}          Group Number: {Primary  GroupNumber}          Policy Number: {Primary  PolicyNumber}          Insured Name: {Primary  InsuredName}          Insured : {Primary  InsuredDOB}          Relationship to Insured: {Primary  RelationshiptoInsured}           Secondary Insurance Information Carrier Name: {Secondary  CarrierName}           Carrier Address:   {Secondary  CarrierAddress}              Carrier Phone: {Secondary  CarrierPhone}          Group Number: {Secondary  GroupNumber}          Policy Number: {Secondary  PolicyNumber}          Insured Name: {Secondary  InsuredName}          Insured : {Secondary  InsuredDOB}          Relationship to Insured: {Secondary  RelationshiptoInsured}                       Health Profile   Booking #:   Jesus Rizo #: [id number]               DOS: 2016    Surgery : PHLEB VEINS - EXTREM - TO 20    Add'l Procedures/Notes:     Surgery Risk: Minor          Precautions          Allergies    Acetaminophen CAPS       Clinical Comments: Reaction Type: , Reaction: , Severity:        Codeine Sulfate TABS       Clinical Comments: Reaction Type: , Reaction: , Severity:        Mold       Morphine Derivatives       Clinical Comments: Reaction Type: , Reaction: , Severity:        No Known Food Allergies       Clinical Comments: Reaction Type: , Reaction: , Severity:        Penicillins       Seasonal       Clinical Comments: Reaction Type: , Reaction: , Severity:        Sulfa Drugs             Medications    Junel FE 1/20 1-20 MG-MCG Oral Tablet       Omeprazole 20 MG Oral Capsule Delayed Release       Omeprazole 40 MG Oral Capsule Delayed Release       One-A-Day Womens TABS       Vitamin B-12 TABS       ZyrTEC Allergy 10 MG Oral Tablet               Conditions    Acne       Adult BMI > 30       Allergic rhinitis due to pollen       Anxiety       Ecchymoses, spontaneous       Fatigue       GERD (gastroesophageal reflux disease)       Hyperparathyroidism       Hyperprolactinemia       Iron deficiency anemia       Sore throat       Sorethroat       Urge and stress incontinence       Varicose veins of both lower extremities with pain       Vitamin D deficiency       Weight gain               Family History    None             Surgical History    None             Social History    Caffeine use       Former smoker       Never a smoker       No alcohol use       No drug use                               Patient Instructions       ? NPO Instructions   The day before surgery it is recommended to have a light dinner at your usual time and you are allowed a light snack early in the evening  Do not eat anything heavy or eat a big meal after 7pm  Do not eat or drink anything after midnight prior to your surgery  If you are supposed to take any of your medications, do so with a sip of water  Failure to follow these instructions can lead to an increased risk of lung complications and may result in a delay or cancellation of your procedure   If you have any questions, contact your institution for further instructions  No candy, no gum, no mints, no chewing tobacco   Triggered by: Medical Procedure Risk         ? Oral Contraceptives 63, 64, 65, 66  Please continue to take this medication on your normal schedule  If this is an oral medication and you take in the morning, you may do so with a sip of water  This medication may need to be discontinued four weeks before surgery if the surgical procedure is associated with a moderate to high risk of venous thromboembolism (e g  hip or knee replacement)  Triggered by: Rolan DIXON 1/20 1-20 MG-MCG Oral Tablet         ? Reflux Disease   Please continue to take this medication on your normal schedule  If this is an oral medication and you take in the morning, you may do so with a sip of water  Triggered by: GERD (gastroesophageal reflux disease)               Testing Considerations       ? Pregnancy Test t  Consider a urine pregnancy test on the morning of surgery  Triggered by: Age or Facility Rec, Gender               Consultations       No recommendations for this classification  Miscellaneous Questions         Question: Are you able to walk up a flight of stairs, walk up a hill or do heavy housework WITHOUT having chest pain or shortness of breath? Answer: YES                   Allergies/Conditions/Medications Not Found        The following were not recognized by our system when generating the recommendations  Please consider if this would impact any preoperative protocols  ? Adult BMI > 30       ? Caffeine use       ? Never a smoker       ? No alcohol use       ? No drug use       ? Omeprazole 40 MG Oral Capsule Delayed Release       ? One-A-Day Womens TABS       ? Vitamin B-12 TABS                  Appointment Information         Date:    06/17/2016        Location:    [place]        Address:           Directions:                      Footnotes revision 14      ?? Denotes a free-text entry        Legal Disclaimer: Any and all recommendations and services provided herein are designed to assist in the preoperative care of the patient  Nothing contained herein is designed to replace, eliminate or alleviate the responsibility of the attending physician to supervise and determine the patient?s preoperative care and course of treatment  Failure to provide complete, accurate information may negatively impact the system?s ability to recommend the proper preoperative protocol  THE ATTENDING PHYSICIAN IS RESPONSIBLE TO REVIEW THE SUGGESTED PREOPERATIVE PROTOCOLS/COURSE OF TREATMENT AND PRESCRIBE THE FINAL COURSE OF PREOPERATIVE TREATMENT IN CONSULTATION WITH THE PATIENT  THE ePREOP SYSTEM AND ITS MATERIALS ARE PROVIDED ? AS IS? WITHOUT WARRANTY OF ANY KIND, EXPRESS OR IMPLIED, INCLUDING, BUT NOT LIMITED TO, WARRANTIES OF PERFORMANCE OR MERCHANTABILITY OR FITNESS FOR A PARTICULAR PURPOSE  PATIENT AND PHYSICIANS HEREBY AGREE THAT THEIR USE OF THE MATERIALS AND RESOURCES ACT AS A CONSENT TO RELEASE AND WAIVE ePREOP FROM ANY AND ALL CLAIMS OF WARRANTY, TORT OR CONTRACT LAW OF ANY KIND             Electronically signed Thao Fiore MD  May 18 2016  6:26PM EST

## 2018-01-11 NOTE — RESULT NOTES
Discussion/Summary   OVERALL VERY GOOD     [facility]     Verified Results  (1) COMPREHENSIVE METABOLIC PANEL [id number] 61:48QT aKmilla Bains     Test Name Result Flag Reference   SODIUM 141 mmol/L  136-145   POTASSIUM 4 5 mmol/L  3 5-5 3   CHLORIDE 107 mmol/L  100-108   CARBON DIOXIDE 28 mmol/L  21-32   ANION GAP (CALC) 6 mmol/L  4-13   BLOOD UREA NITROGEN 17 mg/dL  5-25   CREATININE 0 77 mg/dL  0 60-1 30   Standardized to IDMS reference method   CALCIUM 8 4 mg/dL  8 3-10 1   BILI, TOTAL 1 00 mg/dL  0 20-1 00   ALK PHOSPHATAS 46 U/L     ALT (SGPT) 20 U/L  12-78   Specimen collection should occur prior to Sulfasalazine administration due to the potential for falsely depressed results  AST(SGOT) 15 U/L  5-45   Specimen collection should occur prior to Sulfasalazine administration due to the potential for falsely depressed results  ALBUMIN 3 5 g/dL  3 5-5 0   TOTAL PROTEIN 6 6 g/dL  6 4-8 2   eGFR 92 ml/min/1 73sq m     National Kidney Disease Education Program recommendations are as follows:  GFR calculation is accurate only with a steady state creatinine  Chronic Kidney disease less than 60 ml/min/1 73 sq  meters  Kidney failure less than 15 ml/min/1 73 sq  meters  GLUCOSE FASTING 95 mg/dL  65-99   Specimen collection should occur prior to Sulfasalazine administration due to the potential for falsely depressed results  Specimen collection should occur prior to Sulfapyridine administration due to the potential for falsely elevated results       (1) LIPID PANEL FASTING W DIRECT LDL REFLEX [id number] 09:42AM Kamilla Bains     Test Name Result Flag Reference   CHOLESTEROL 139 mg/dL     LDL CHOLESTEROL CALCULATED 68 mg/dL  0-100   Triglyceride:        Normal <150 mg/dl   Borderline High 150-199 mg/dl   High 200-499 mg/dl   Very High >499 mg/dl      Cholesterol:       Desirable <200 mg/dl    Borderline High 200-239 mg/dl    High >239 mg/dl      HDL Cholesterol:       High>59 mg/dL    Low <41 mg/dL      HDL Cholesterol:       High>59 mg/dL    Low <41 mg/dL      This screening LDL is a calculated result  It does not have the accuracy of the Direct Measured LDL in the monitoring of patients with hyperlipidemia and/or statin therapy  Direct Measure LDL ([id number]) must be ordered separately in these patients  TRIGLYCERIDES 36 mg/dL  <=150   Specimen collection should occur prior to N-Acetylcysteine or Metamizole administration due to the potential for falsely depressed results  HDL,DIRECT 64 mg/dL H 40-60   Specimen collection should occur prior to Metamizole administration due to the potential for falsley depressed results  (1) TSH WITH FT4 REFLEX [id number] 09:42AM Faheem Duck     Test Name Result Flag Reference   TSH 1 353 uIU/mL  0 358-3 740   Patients undergoing fluorescein dye angiography may retain small amounts of fluorescein in the body for 48-72 hours post procedure  Samples containing fluorescein can produce falsely depressed TSH values  If the patient had this procedure,a specimen should be resubmitted post fluorescein clearance  The recommended reference ranges for TSH during pregnancy are as follows:  First trimester 0 1 to 2 5 uIU/mL  Second trimester  0 2 to 3 0 uIU/mL  Third trimester 0 3 to 3 0 uIU/m     (1) VITAMIN D 25-HYDROXY [id number] 09:42AM Faheem Duck     Test Name Result Flag Reference   VIT D 25-HYDROX 30 7 ng/mL  30 0-100 0   This assay is a certified procedure of the CDC Vitamin D Standardization Certification Program (VDSCP)     Deficiency <20ng/ml   Insufficiency 20-30ng/ml   Sufficient  ng/ml     *Patients undergoing fluorescein dye angiography may retain small amounts of fluorescein in the body for 48-72 hours post procedure  Samples containing fluorescein can produce falsely elevated Vitamin D values  If the patient had this procedure, a specimen should be resubmitted post fluorescein clearance

## 2018-01-11 NOTE — PROGRESS NOTES
History of Present Illness  HPI: Johanna Givens with 1# loss and overall 42#  She states her calories flex up on weekends related to dining out and sodium intake  She states she thinks she loses during the week and notices a regain on weekends  Not going to the gym as much as before  Reviewed meal and discussed trying to flow the plan during the weekend as well  Bariatric MNT MWM[facility] [address]:   Weight Assessment: IBW: 125#, Goal Weight: 155-165#  Excess calories come from in between meal snacking, large portions at mealtimes and high calorie high fat food choices  Dietary Recall:   Breakfast: whole wheat PB  80 calorie yogurt/ granola  Snack: protein bar  Lunch: Shake/ walking  Snack: protein bar  Dinner: taco- small portions  Snack: skip   low-fat pudding   Beverages: water   She dines out 2-3 times per week  Exercise Frequency:  She exercises Gym cardio/ strength try for 4-5 x week and walk for 30 minutes  Nutrition Prescription: Estimated calories for weight loss: Tanita 1313 x 1 3-750 = 1087, Estimated daily protein needs: 68-85 ( 1 2-1 5 gm/kg IBW) and Estimated daily fluid needs: 66oz ( 35ml/kg IBW)  Nutrition Intervention: Counseling provided with emphasis on meal planning, portion sizes, healthy snack choices, hydration, fiber intake, protein intake, exercise and food journaling  Education materials provided: New Direction manual    Comprehension: good  Expected Compliance: good  Goals: follow meal plan prescribed, reduce portion sizes at mealtimes, plan meals and snacks daily, Choose lower-calorie, lower-fat meal options at home and when dining out, food journal and 8155-2658 calories   Monitor/Evaluation: weekly weight, food journal, fluid intake and exercise level  Active Problems    1  Acne (706 1) (L70 9)   2  Adult BMI > 30 (V85 30) (E66 8)   3  Allergic rhinitis due to pollen (477 0) (J30 1)   4  Anxiety (300 00) (F41 9)   5   Ecchymoses, spontaneous (782 7) (R23 3)   6  Fatigue (780 79) (R53 83)   7  GERD (gastroesophageal reflux disease) (530 81) (K21 9)   8  Hyperparathyroidism (252 00) (E21 3)   9  Hyperprolactinemia (253 1) (E22 1)   10  Iron deficiency anemia (280 9) (D50 9)   11  Sore throat (462) (J02 9)   12  Sorethroat (462) (J02 9)   13  Urge and stress incontinence (788 33) (N39 46)   14  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 8) (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 813)   15  Vitamin D deficiency (268 9) (E55 9)   16  Weight gain (783 1) (R63 5)    Past Medical History    1  History of Anemia (285 9) (D64 9)   2  History of seasonal allergies (V15 09) (Z88 9)   3  History of Migraines (346 90) (G43 909)   4  Personal history of asthma (V12 69) (Z87 09)    Surgical History    1  History of Tonsillectomy With Adenoidectomy   2  History of Tubal Ligation   3  History of Varicose Vein Ligation    Family History  Mother    1  Family history of Anxiety and depression   2  Family history of malignant neoplasm (V16 9) (Z80 9)   3  Family history of Hypothyroidism  Father    4  Family history of Anxiety and depression   5  Family history of Arthritis   6  Family history of COPD (chronic obstructive pulmonary disease)   7  Family history of cardiac disorder (V17 49) (Z82 49)   8  Family history of diabetes mellitus (V18 0) (Z83 3)   9  Family history of hypertension (V17 49) (Z82 49)   10  Family history of malignant neoplasm (V16 9) (Z80 9)  Aunt    11  Family history of malignant neoplasm (V16 9) (Z80 9)  Maternal Aunt    12  Family history of malignant neoplasm of breast (V16 3) (Z80 3)    Social History    · Caffeine use (V49 89) (F15 90)   · Former smoker (A11 54) (N42 839)   · Never a smoker   · No alcohol use   · No drug use    Current Meds   1  Junel FE 1/20 1-20 MG-MCG Oral Tablet; Therapy: ([place]) to Recorded   2  Omeprazole 20 MG Oral Capsule Delayed Release; TAKE 1 CAPSULE DAILY EVERY   MORNING BEFORE BREAKFAST;    Therapy: 21Jan2016 to (Evaluate:20Apr2016)  Requested for: 22Apr2016; Last   Rx:21Jan2016; Status: ACTIVE - Renewal Denied Ordered   3  Omeprazole 40 MG Oral Capsule Delayed Release; TAKE ONE CAPSULE BY MOUTH   EVERY DAY; Therapy: 30Apr2015 to (Last Rx:[id number])  Requested for: 21Jan2016 Ordered   4  One-A-Day Womens TABS; Therapy: (355 228 100) to Recorded   5  Vitamin B-12 TABS; Therapy: (041 537 100) to Recorded   6  ZyrTEC Allergy 10 MG Oral Tablet; take 1 tablet daily as needed; Therapy: 88GDF9005 to Recorded    Allergies    1  Sulfa Drugs   2  Acetaminophen CAPS   3  Codeine Sulfate TABS   4  Penicillins   5  Morphine Derivatives    6  Mold   7  No Known Food Allergies   8  Seasonal    Vitals  Signs [Data Includes: Current Encounter]   Recorded: 62FZV2903 10:00AM   Height: 5 ft 5 in  Weight: 179 lb   BMI Calculated: 29 79  BSA Calculated: 1 89    Future Appointments    Date/Time Provider Specialty Site   05/24/2016 09:00 AM Kari Serna  [facility]   05/17/2016 08:30 AM MELANIE Chin   Bariatric Medicine [facility] [place]     Signatures   Electronically signed by : Jon Venegas, ; May 10 2016  3:35PM EST                       (Author)    Electronically signed by : MELANIE Ryan ; May 10 2016  5:00PM EST                       (Co-author)

## 2018-01-12 ENCOUNTER — ALLSCRIPTS OFFICE VISIT (OUTPATIENT)
Dept: OTHER | Facility: OTHER | Age: 48
End: 2018-01-12

## 2018-01-12 LAB
BILIRUB UR QL STRIP: NORMAL
CLARITY UR: NORMAL
COLOR UR: YELLOW
GLUCOSE (HISTORICAL): NORMAL
HGB UR QL STRIP.AUTO: NORMAL
KETONES UR STRIP-MCNC: NORMAL MG/DL
LEUKOCYTE ESTERASE UR QL STRIP: NORMAL
NITRITE UR QL STRIP: NORMAL
PH UR STRIP.AUTO: 5 [PH]
PROT UR STRIP-MCNC: NORMAL MG/DL
SP GR UR STRIP.AUTO: NORMAL
UROBILINOGEN UR QL STRIP.AUTO: NORMAL

## 2018-01-13 VITALS
WEIGHT: 184.38 LBS | HEART RATE: 68 BPM | HEIGHT: 65 IN | SYSTOLIC BLOOD PRESSURE: 154 MMHG | RESPIRATION RATE: 18 BRPM | BODY MASS INDEX: 30.72 KG/M2 | DIASTOLIC BLOOD PRESSURE: 80 MMHG

## 2018-01-13 VITALS
WEIGHT: 186.38 LBS | HEART RATE: 68 BPM | SYSTOLIC BLOOD PRESSURE: 118 MMHG | RESPIRATION RATE: 16 BRPM | BODY MASS INDEX: 31.01 KG/M2 | DIASTOLIC BLOOD PRESSURE: 72 MMHG | TEMPERATURE: 99 F

## 2018-01-13 VITALS
RESPIRATION RATE: 16 BRPM | HEART RATE: 72 BPM | HEIGHT: 65 IN | SYSTOLIC BLOOD PRESSURE: 140 MMHG | DIASTOLIC BLOOD PRESSURE: 82 MMHG | BODY MASS INDEX: 30.51 KG/M2 | WEIGHT: 183.13 LBS | TEMPERATURE: 98.4 F

## 2018-01-13 NOTE — MISCELLANEOUS
Message  pt called stating she is trying to wear comp hose s/p surgery but it keeps falling down and pushing on her incisions and causing them to ooze  also it is making her itch  s/w B Kacey SELBY, pt to use ace wrap until she is able to tolerate wearing comp hose  pt verb understanding of same, she ? if she had to include foot, informed her yes, she should start at base of toes  she will call if any further questions or concerns  Active Problems    1  Acne (706 1) (L70 9)   2  Adult BMI > 30 (V85 30) (E66 8)   3  Allergic rhinitis due to pollen (477 0) (J30 1)   4  Anxiety (300 00) (F41 9)   5  Breast cancer screening (V76 10) (Z12 39)   6  Ecchymoses, spontaneous (782 7) (R23 3)   7  Encounter for screening mammogram for breast cancer (V76 12) (Z12 31)   8  GERD (gastroesophageal reflux disease) (530 81) (K21 9)   9  Hyperparathyroidism (252 00) (E21 3)   10  Hyperprolactinemia (253 1) (E22 1)   11  Iron deficiency anemia (280 9) (D50 9)   12  Need for influenza vaccination (V04 81) (Z23)   13  Need for Tdap vaccination (V06 1) (Z23)   14  Preop examination (V72 84) (Z01 818)   15  Urge and stress incontinence (788 33) (N39 46)   16  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 8) (I83 813)   17  Vitamin D deficiency (268 9) (E55 9)    Current Meds   1  ALPRAZolam 0 25 MG Oral Tablet; TAKE 1 TABLET AT BEDTIME AS NEEDED; Therapy: 91QTS6832 to (Evaluate:33Cok9331); Last Rx:16Jun2016 Ordered   2  Junel FE 1/20 1-20 MG-MCG Oral Tablet; Therapy: (Rod Gibson) to Recorded   3  Omeprazole 20 MG Oral Capsule Delayed Release; TAKE 1 CAPSULE DAILY EVERY   MORNING BEFORE BREAKFAST; Therapy: 21Jan2016 to (Evaluate:11Jun2017)  Requested for: 03FIS2906; Last   Rx:16Jun2016 Ordered   4  One-A-Day Womens TABS; Therapy: () to Recorded   5  Pepcid 20 MG Oral Tablet (Famotidine); TAKE 1 TABLET DAILY AS DIRECTED; Therapy: 47HWF2976 to Recorded   6   ZyrTEC Allergy 10 MG Oral Tablet; take 1 tablet daily as needed; Therapy: 77BSF5009 to Recorded    Allergies    1  Sulfa Drugs   2  Acetaminophen CAPS   3  Codeine Sulfate TABS   4  Latex Exam Gloves Miscellaneous   5  Penicillins   6  Morphine Derivatives    7  Mold   8  No Known Food Allergies   9   Seasonal    Signatures   Electronically signed by : Gabriela aCrtagena, ; Jun 21 2016  3:55PM EST                       (Author)

## 2018-01-13 NOTE — PROGRESS NOTES
History of Present Illness  HPI: Johanna Givens had maintained her weight loss since leaving our program  She is interested in getting off PPI and her PCP said weight loss would help  Still maintaining total loss 33 5#  She is interested resuming the meal replacements and joining our no class option  Bariatric MNT [facility]:   Weight Assessment: IBW: 125#, Goal Weight: 165-155#  Excess calories come from in between meal snacking, large portions at mealtimes and high calorie high fat food choices  Dietary Recall:   Breakfast: yogurt - greek  1/4 cup granola- high protein  Snack: federico bar or fruit or nuts  Lunch: Slim fast high protein (20gm pro)  Snack: as above  Dinner: macaroni cheese   Get home:chococlate    macaroni and cheese  sandwich thin   no veggies sometimes  Snack: skinny pop popcorn  veggie straws (38)   Beverages: water  Estimated fluid intake: >64oz fluid  She dines out 2-3 times per week  Exercise Frequency:  She exercises hour cardio at gym and walk 30 minutes at lunch and strength   Her obesity/being overweight is related to excessive energy intake and as evidenced by BMI 30  Nutrition Prescription: Estimated calories for weight loss: Tanita 1413 x 1 3-750 = 1087 calories Ecal BMR; 1471 Weight loss range: 4821-1430, Estimated daily protein needs: 68-85 gm ( 1 2-1 5 gm/kg IBW) and Estimated daily fluid needs: 66oz fluid ( 35ml/kg)  Breakfast: Pudding  Snack: 150  Lunch: Shake  Snack: 150  Dinner: 300  Snack: 150-200   Nutrition Intervention: Counseling provided with emphasis on meal planning, portion sizes, healthy snack choices, hydration, fiber intake, exercise and food journaling  Education materials provided: [employer] manual    Comprehension: good  Expected Compliance: good     Goals: follow meal plan prescribed, reduce portion sizes at mealtimes, plan meals and snacks daily, Choose lower-calorie, lower-fat meal options at home and when dining out, food journal, do not skip meals or snacks, increase protein intake 90 grams daily and 1200 calories on non exercise days and 1400 calories   Monitor/Evaluation: weekly weight, food journal, fluid intake and exercise level  Active Problems    1  Acne (706 1) (L70 9)   2  Adult BMI > 30 (V85 30) (E66 8)   3  Allergic rhinitis due to pollen (477 0) (J30 1)   4  Anxiety (300 00) (F41 9)   5  Ecchymoses, spontaneous (782 7) (R23 3)   6  Fatigue (780 79) (R53 83)   7  GERD (gastroesophageal reflux disease) (530 81) (K21 9)   8  Hyperparathyroidism (252 00) (E21 3)   9  Hyperprolactinemia (253 1) (E22 1)   10  Iron deficiency anemia (280 9) (D50 9)   11  Sore throat (462) (J02 9)   12  Sorethroat (462) (J02 9)   13  Urge and stress incontinence (788 33) (N39 46)   14  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 8) (I83 813)   15  Vitamin D deficiency (268 9) (E55 9)   16  Weight gain (783 1) (R63 5)    Past Medical History    1  History of Anemia (285 9) (D64 9)   2  History of seasonal allergies (V15 09) (Z88 9)   3  History of Migraines (346 90) (G43 909)   4  Personal history of asthma (V12 69) (Z87 09)    Surgical History    1  History of Tonsillectomy With Adenoidectomy   2  History of Tubal Ligation   3  History of Varicose Vein Ligation    Family History    1  Family history of Anxiety and depression   2  Family history of malignant neoplasm (V16 9) (Z80 9)   3  Family history of Hypothyroidism    4  Family history of Anxiety and depression   5  Family history of Arthritis   6  Family history of COPD (chronic obstructive pulmonary disease)   7  Family history of cardiac disorder (V17 49) (Z82 49)   8  Family history of diabetes mellitus (V18 0) (Z83 3)   9  Family history of hypertension (V17 49) (Z82 49)   10  Family history of malignant neoplasm (V16 9) (Z80 9)    11  Family history of malignant neoplasm (V16 9) (Z80 9)    12   Family history of malignant neoplasm of breast (V16 3) (Z80 3)    Social History    · Caffeine use (V49 89) (F15 90)   · Former smoker (B89 17) (B77 169)   · Never a smoker   · No alcohol use   · No drug use    Current Meds   1  Junel FE 1/20 1-20 MG-MCG Oral Tablet; Therapy: (99 524326) to Recorded   2  Omeprazole 20 MG Oral Capsule Delayed Release; TAKE 1 CAPSULE DAILY EVERY   MORNING BEFORE BREAKFAST; Therapy: 21Jan2016 to (Evaluate:20Apr2016)  Requested for: 21Jan2016; Last   Rx:21Jan2016 Ordered   3  Omeprazole 40 MG Oral Capsule Delayed Release; TAKE ONE CAPSULE BY MOUTH   EVERY DAY; Therapy: 30Apr2015 to (Last Rx:[id number])  Requested for: 21Jan2016 Ordered   4  One-A-Day Womens TABS; Therapy: (9775-6074347) to Recorded   5  Vitamin B-12 TABS; Therapy: (0532-8421059) to Recorded   6  ZyrTEC Allergy 10 MG Oral Tablet; take 1 tablet daily as needed; Therapy: 37EIN3463 to Recorded    Allergies    1  Sulfa Drugs   2  Acetaminophen CAPS   3  Codeine Sulfate TABS   4  Penicillins   5  Morphine Derivatives    6  Mold   7  No Known Food Allergies   8   Seasonal    Vitals  Signs [Data Includes: Current Encounter]   Recorded: 90YMN1343 08:39AM   Height: 5 ft 5 in  Weight: 183 lb   BMI Calculated: 30 45  BSA Calculated: 1 9    Future Appointments    Date/Time Provider Specialty Site   04/12/2016 09:45 AM Lorenzo Cabral  [facility]     Signatures   Electronically signed by : Toina Fung, ; Apr 7 2016  9:34AM EST                       (Author)    Electronically signed by : MELANIE Espinosa ; Apr 7 2016  1:26PM EST                       (Co-author)

## 2018-01-14 VITALS
HEART RATE: 64 BPM | WEIGHT: 180 LBS | TEMPERATURE: 99.2 F | SYSTOLIC BLOOD PRESSURE: 110 MMHG | BODY MASS INDEX: 29.99 KG/M2 | HEIGHT: 65 IN | RESPIRATION RATE: 18 BRPM | DIASTOLIC BLOOD PRESSURE: 76 MMHG

## 2018-01-14 VITALS
BODY MASS INDEX: 29.32 KG/M2 | HEIGHT: 65 IN | DIASTOLIC BLOOD PRESSURE: 82 MMHG | WEIGHT: 176 LBS | SYSTOLIC BLOOD PRESSURE: 138 MMHG

## 2018-01-14 NOTE — MISCELLANEOUS
Message  LMOM to clarify need for refill on omeprazole 40mg  If she is still requiring this medication, she may warrant GI evaluation w/ at least an upper endoscopy   will hold off on refill and await call back      Signatures   Electronically signed by : MELANIE Truong ; Apr 21 2016  8:28AM EST                       (Author)

## 2018-01-14 NOTE — RESULT NOTES
Verified Results  (1) URINE CULTURE 30Jun2017 07:45PM Jeff Funk Order Number: [id number]     Test Name Result Flag Reference   CLINICAL REPORT (Report)     Test:        Urine culture  Specimen Type:   Urine  Specimen Date:   6/30/2017 7:45 PM  Result Date:    7/1/2017 7:13 PM  Result Status:   Final result  Resulting Lab:   [address] [address]            Tel: [phone]      CULTURE                                       ------------------                                   No Growth <1000 cfu/mL

## 2018-01-17 NOTE — PROGRESS NOTES
History of Present Illness  HPI: Johnice Fothergill with 2 2 # loss the past 2 weeks and an overall loss of 44 2# She stated she is more mindful of food choices and portions  Did not dine out over the weekend and has restricted sodium intake  Food journaling 1200 average and flex up 1400 calories on exercise days  Having surgery on her varicose veins this month  Bariatric MNT [facility]ANIE [place]:   Weight Assessment: IBW: 125#, Goal Weight: 165-155#  Excess calories come from in between meal snacking, large portions at mealtimes and high calorie high fat food choices  Dietary Recall:   Beverages: water  Estimated fluid intake: >64oz  She dines out 1-2 times per week  Exercise Frequency:  She exercises walks daily at lunch and goes to gym 4-5 x week with cardio / strength blend  Her obesity/being overweight is related to   Nutrition Prescription: Estimated calories for weight loss: Tanita 1413 x 1 3 -750- = 1086, Estimated daily protein needs: 68-85 gm ( 1 2-1 5 gm/kg IBW) and Estimated daily fluid needs: 66oz ( 35ml/kg IBW)  Nutrition Intervention: Counseling provided with emphasis on meal planning, portion sizes, healthy snack choices, hydration, fiber intake, protein intake and exercise  Education materials provided: [employer] Direction manual    Comprehension: good  Expected Compliance: good  Goals: follow meal plan prescribed, reduce portion sizes at mealtimes, plan meals and snacks daily, Choose lower-calorie, lower-fat meal options at home and when dining out, food journal, do not skip meals or snacks, increase protein intake 75-90gm daily and 1200 calories non exercise days and 1400 gym days  Monitor/Evaluation: weekly weight, food journal, fluid intake and exercise level  Active Problems    1  Acne (706 1) (L70 9)   2  Adult BMI > 30 (V85 30) (E66 8)   3  Allergic rhinitis due to pollen (477 0) (J30 1)   4  Anxiety (300 00) (F41 9)   5  Ecchymoses, spontaneous (782 7) (R23 3)   6   Fatigue (780 79) (R53 83)   7  GERD (gastroesophageal reflux disease) (530 81) (K21 9)   8  Hyperparathyroidism (252 00) (E21 3)   9  Hyperprolactinemia (253 1) (E22 1)   10  Iron deficiency anemia (280 9) (D50 9)   11  Sore throat (462) (J02 9)   12  Sorethroat (462) (J02 9)   13  Urge and stress incontinence (788 33) (N39 46)   14  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 8) (I83 813)   15  Vitamin D deficiency (268 9) (E55 9)   16  Weight gain (783 1) (R63 5)    Past Medical History    1  History of Anemia (285 9) (D64 9)   2  History of seasonal allergies (V15 09) (Z88 9)   3  History of Migraines (346 90) (G43 909)   4  Personal history of asthma (V12 69) (Z87 09)    Surgical History    1  History of Tonsillectomy With Adenoidectomy   2  History of Tubal Ligation   3  History of Varicose Vein Ligation    Family History  Mother    1  Family history of Anxiety and depression   2  Family history of malignant neoplasm (V16 9) (Z80 9)   3  Family history of Hypothyroidism  Father    4  Family history of Anxiety and depression   5  Family history of Arthritis   6  Family history of COPD (chronic obstructive pulmonary disease)   7  Family history of cardiac disorder (V17 49) (Z82 49)   8  Family history of diabetes mellitus (V18 0) (Z83 3)   9  Family history of hypertension (V17 49) (Z82 49)   10  Family history of malignant neoplasm (V16 9) (Z80 9)  Aunt    11  Family history of malignant neoplasm (V16 9) (Z80 9)  Maternal Aunt    12  Family history of malignant neoplasm of breast (V16 3) (Z80 3)    Social History    · Caffeine use (V49 89) (F15 90)   · Former smoker (P45 77) (R96 774)   · Never a smoker   · No alcohol use   · No drug use    Current Meds   1  Junel FE 1/20 1-20 MG-MCG Oral Tablet; Therapy: (Patrica Bidding) to Recorded   2  Omeprazole 20 MG Oral Capsule Delayed Release; TAKE 1 CAPSULE DAILY EVERY   MORNING BEFORE BREAKFAST;    Therapy: 20GML2656 to (Evaluate:53Ljd3196)  Requested for: 22Apr2016; Last   Rx:21Jan2016; Status: ACTIVE - Renewal Denied Ordered   3  Omeprazole 40 MG Oral Capsule Delayed Release; TAKE ONE CAPSULE BY MOUTH   EVERY DAY; Therapy: 30Apr2015 to (Last Rx:[id number])  Requested for: 21Jan2016 Ordered   4  One-A-Day Womens TABS; Therapy: (77 988 259) to Recorded   5  Vitamin B-12 TABS; Therapy: (51 981 282) to Recorded   6  ZyrTEC Allergy 10 MG Oral Tablet; take 1 tablet daily as needed; Therapy: 18UAY4075 to Recorded    Allergies    1  Sulfa Drugs   2  Acetaminophen CAPS   3  Codeine Sulfate TABS   4  Penicillins   5  Morphine Derivatives    6  Mold   7  No Known Food Allergies   8  Seasonal    Vitals  Signs [Data Includes: Current Encounter]   Recorded: 28YCC6202 08:56AM   Height: 5 ft 5 in  Weight: 176 lb 12 8 oz  BMI Calculated: 29 42  BSA Calculated: 1 88    Future Appointments    Date/Time Provider Specialty Site   07/01/2016 08:30 AM ALMA Hurtado [facility] [address] [place] [address]   07/12/2016 08:45 AM MELANIE Hernandez   Bariatric Medicine [facility]   06/16/2016 10:30 AM Coty Chavez DO Family Medicine [facility]     Signatures   Electronically signed by : Sly Mccallum, ; May 25 2016  9:37AM EST                       (Author)    Electronically signed by : MELANIE Jean ; May 25 2016 10:28AM EST                       (Co-author)

## 2018-03-05 ENCOUNTER — LAB (OUTPATIENT)
Dept: LAB | Facility: CLINIC | Age: 48
End: 2018-03-05
Payer: COMMERCIAL

## 2018-03-05 ENCOUNTER — OFFICE VISIT (OUTPATIENT)
Dept: FAMILY MEDICINE CLINIC | Facility: CLINIC | Age: 48
End: 2018-03-05
Payer: COMMERCIAL

## 2018-03-05 ENCOUNTER — TRANSCRIBE ORDERS (OUTPATIENT)
Dept: LAB | Facility: CLINIC | Age: 48
End: 2018-03-05

## 2018-03-05 VITALS — SYSTOLIC BLOOD PRESSURE: 120 MMHG | DIASTOLIC BLOOD PRESSURE: 80 MMHG

## 2018-03-05 DIAGNOSIS — M25.531 PAIN OF BOTH WRIST JOINTS: ICD-10-CM

## 2018-03-05 DIAGNOSIS — M25.552 CHRONIC ARTHRALGIAS OF KNEES AND HIPS: ICD-10-CM

## 2018-03-05 DIAGNOSIS — M25.561 CHRONIC ARTHRALGIAS OF KNEES AND HIPS: ICD-10-CM

## 2018-03-05 DIAGNOSIS — R20.9 COLD EXTREMITY WITHOUT PERIPHERAL VASCULAR DISEASE: ICD-10-CM

## 2018-03-05 DIAGNOSIS — R53.82 CHRONIC FATIGUE: ICD-10-CM

## 2018-03-05 DIAGNOSIS — M25.562 CHRONIC ARTHRALGIAS OF KNEES AND HIPS: ICD-10-CM

## 2018-03-05 DIAGNOSIS — M25.551 CHRONIC ARTHRALGIAS OF KNEES AND HIPS: ICD-10-CM

## 2018-03-05 DIAGNOSIS — M79.10 MYALGIA: ICD-10-CM

## 2018-03-05 DIAGNOSIS — G89.29 CHRONIC ARTHRALGIAS OF KNEES AND HIPS: ICD-10-CM

## 2018-03-05 DIAGNOSIS — M25.532 PAIN OF BOTH WRIST JOINTS: ICD-10-CM

## 2018-03-05 DIAGNOSIS — R53.82 CHRONIC FATIGUE: Primary | ICD-10-CM

## 2018-03-05 LAB
ALBUMIN SERPL BCP-MCNC: 3.9 G/DL (ref 3.5–5)
ALP SERPL-CCNC: 46 U/L (ref 46–116)
ALT SERPL W P-5'-P-CCNC: 32 U/L (ref 12–78)
ANION GAP SERPL CALCULATED.3IONS-SCNC: 9 MMOL/L (ref 4–13)
AST SERPL W P-5'-P-CCNC: 15 U/L (ref 5–45)
BASOPHILS # BLD AUTO: 0.03 THOUSANDS/ΜL (ref 0–0.1)
BASOPHILS NFR BLD AUTO: 1 % (ref 0–1)
BILIRUB SERPL-MCNC: 1.1 MG/DL (ref 0.2–1)
BUN SERPL-MCNC: 16 MG/DL (ref 5–25)
CALCIUM SERPL-MCNC: 8.6 MG/DL (ref 8.3–10.1)
CHLORIDE SERPL-SCNC: 105 MMOL/L (ref 100–108)
CO2 SERPL-SCNC: 27 MMOL/L (ref 21–32)
CREAT SERPL-MCNC: 0.85 MG/DL (ref 0.6–1.3)
CRP SERPL QL: <3 MG/L
EOSINOPHIL # BLD AUTO: 0.04 THOUSAND/ΜL (ref 0–0.61)
EOSINOPHIL NFR BLD AUTO: 1 % (ref 0–6)
ERYTHROCYTE [DISTWIDTH] IN BLOOD BY AUTOMATED COUNT: 12.3 % (ref 11.6–15.1)
ERYTHROCYTE [SEDIMENTATION RATE] IN BLOOD: 3 MM/HOUR (ref 0–20)
GFR SERPL CREATININE-BSD FRML MDRD: 81 ML/MIN/1.73SQ M
GLUCOSE P FAST SERPL-MCNC: 87 MG/DL (ref 65–99)
HCT VFR BLD AUTO: 40.3 % (ref 34.8–46.1)
HGB BLD-MCNC: 13.1 G/DL (ref 11.5–15.4)
LYMPHOCYTES # BLD AUTO: 1.97 THOUSANDS/ΜL (ref 0.6–4.47)
LYMPHOCYTES NFR BLD AUTO: 33 % (ref 14–44)
MCH RBC QN AUTO: 30.9 PG (ref 26.8–34.3)
MCHC RBC AUTO-ENTMCNC: 32.5 G/DL (ref 31.4–37.4)
MCV RBC AUTO: 95 FL (ref 82–98)
MONOCYTES # BLD AUTO: 0.43 THOUSAND/ΜL (ref 0.17–1.22)
MONOCYTES NFR BLD AUTO: 7 % (ref 4–12)
NEUTROPHILS # BLD AUTO: 3.58 THOUSANDS/ΜL (ref 1.85–7.62)
NEUTS SEG NFR BLD AUTO: 58 % (ref 43–75)
PLATELET # BLD AUTO: 244 THOUSANDS/UL (ref 149–390)
PMV BLD AUTO: 9.6 FL (ref 8.9–12.7)
POTASSIUM SERPL-SCNC: 4.2 MMOL/L (ref 3.5–5.3)
PROT SERPL-MCNC: 6.9 G/DL (ref 6.4–8.2)
RBC # BLD AUTO: 4.24 MILLION/UL (ref 3.81–5.12)
SODIUM SERPL-SCNC: 141 MMOL/L (ref 136–145)
TSH SERPL DL<=0.05 MIU/L-ACNC: 1.13 UIU/ML (ref 0.36–3.74)
WBC # BLD AUTO: 6.05 THOUSAND/UL (ref 4.31–10.16)

## 2018-03-05 PROCEDURE — 36415 COLL VENOUS BLD VENIPUNCTURE: CPT

## 2018-03-05 PROCEDURE — 82306 VITAMIN D 25 HYDROXY: CPT

## 2018-03-05 PROCEDURE — 86665 EPSTEIN-BARR CAPSID VCA: CPT

## 2018-03-05 PROCEDURE — 99214 OFFICE O/P EST MOD 30 MIN: CPT | Performed by: FAMILY MEDICINE

## 2018-03-05 PROCEDURE — 86140 C-REACTIVE PROTEIN: CPT

## 2018-03-05 PROCEDURE — 85652 RBC SED RATE AUTOMATED: CPT

## 2018-03-05 PROCEDURE — 86663 EPSTEIN-BARR ANTIBODY: CPT

## 2018-03-05 PROCEDURE — 80053 COMPREHEN METABOLIC PANEL: CPT

## 2018-03-05 PROCEDURE — 86038 ANTINUCLEAR ANTIBODIES: CPT

## 2018-03-05 PROCEDURE — 85025 COMPLETE CBC W/AUTO DIFF WBC: CPT

## 2018-03-05 PROCEDURE — 86664 EPSTEIN-BARR NUCLEAR ANTIGEN: CPT

## 2018-03-05 PROCEDURE — 86747 PARVOVIRUS ANTIBODY: CPT

## 2018-03-05 PROCEDURE — 86430 RHEUMATOID FACTOR TEST QUAL: CPT

## 2018-03-05 PROCEDURE — 84443 ASSAY THYROID STIM HORMONE: CPT

## 2018-03-05 NOTE — PROGRESS NOTES
Assessment/Plan:    Chronic fatigue  Unclear etiology for all of Kasandra's symptoms at this time, but she is stable on exam   It may simply be some deconditioning with returning to an active job after returning from surgery, mixed with some mild CTS at both wrists, and tennis elbow  Discussed trying OTC wrist splints and elbow / Chopat straps  We will check a battery of non-fasting labs at this time as a precaution  She is to f/u with her PCP (Dr Talib Keenan) in 1 month, or sooner PRN  Diagnoses and all orders for this visit:    Chronic fatigue  -     CBC and differential; Future  -     Sedimentation rate, automated; Future  -     C-reactive protein; Future  -     TSH, 3rd generation with T4 reflex; Future  -     Comprehensive metabolic panel; Future  -     HUGO Screen w/ Reflex to Titer/Pattern; Future  -     RF Screen w/ Reflex to Titer; Future  -     Vitamin D 25 hydroxy; Future  -     Parvovirus B19 antibody, IgG and IgM; Future  -     EBV acute panel; Future    Myalgia  Comments:  As above  Orders:  -     CBC and differential; Future  -     Sedimentation rate, automated; Future  -     C-reactive protein; Future  -     TSH, 3rd generation with T4 reflex; Future  -     Comprehensive metabolic panel; Future  -     HUGO Screen w/ Reflex to Titer/Pattern; Future  -     RF Screen w/ Reflex to Titer; Future  -     Vitamin D 25 hydroxy; Future  -     Parvovirus B19 antibody, IgG and IgM; Future  -     EBV acute panel; Future    Chronic arthralgias of knees and hips  Comments:  As above  Orders:  -     CBC and differential; Future  -     Sedimentation rate, automated; Future  -     C-reactive protein; Future  -     TSH, 3rd generation with T4 reflex; Future  -     Comprehensive metabolic panel; Future  -     HUGO Screen w/ Reflex to Titer/Pattern; Future  -     RF Screen w/ Reflex to Titer; Future  -     Vitamin D 25 hydroxy; Future  -     Parvovirus B19 antibody, IgG and IgM;  Future  -     EBV acute panel; Future    Pain of both wrist joints  Comments:  As above  Orders:  -     CBC and differential; Future  -     Sedimentation rate, automated; Future  -     C-reactive protein; Future  -     TSH, 3rd generation with T4 reflex; Future  -     Comprehensive metabolic panel; Future  -     HUGO Screen w/ Reflex to Titer/Pattern; Future  -     RF Screen w/ Reflex to Titer; Future  -     Vitamin D 25 hydroxy; Future  -     Parvovirus B19 antibody, IgG and IgM; Future  -     EBV acute panel; Future    Cold extremity without peripheral vascular disease  Comments:  As above  Will also check Doppler US of the RUE as a precaution, given her complaints of coldness  Normal exam today  Orders:  -     VAS upper limb venous duplex scan, unilateral/limited; Future          Subjective:      Patient ID: Maranda Alpers is a 49 y o  female  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; a lot of lifting; had had a bladder sling procedure) -> muscle fatigue, memory issues, generalized fatigue  Some cold intolerance also noted  No hair loss  She also concerned with a chronic coldness of the RUE only  The following portions of the patient's history were reviewed and updated as appropriate: allergies, current medications, past family history, past social history, past surgical history and problem list     Review of Systems   Eyes: Positive for visual disturbance  Gastrointestinal: Negative for blood in stool  Endocrine: Positive for cold intolerance  Genitourinary: Negative for vaginal bleeding  Musculoskeletal: Positive for arthralgias and myalgias  Many pains in the forearms, hands  Skin:        +Dry skin  No noticed hair loss  Objective:      /80 (BP Location: Left arm, Patient Position: Sitting, Cuff Size: Standard)          Physical Exam   Constitutional: She is oriented to person, place, and time  She appears well-developed and well-nourished  No distress     HENT: Head: Normocephalic and atraumatic  Right Ear: Hearing, tympanic membrane, external ear and ear canal normal    Left Ear: Hearing, tympanic membrane, external ear and ear canal normal    Mouth/Throat: Oropharynx is clear and moist  No oropharyngeal exudate  Eyes: Conjunctivae are normal    Neck: Normal range of motion  Neck supple  No thyromegaly present  Cardiovascular: Normal rate, regular rhythm and normal heart sounds  Exam reveals no gallop and no friction rub  No murmur heard  Pulmonary/Chest: Breath sounds normal  No respiratory distress  She has no wheezes  She has no rales  Musculoskeletal: Normal range of motion  She exhibits no deformity  Bilateral hands and forearms appear normal on exam; mild discomfort at the medial epicondyles bilaterally  Lymphadenopathy:     She has no cervical adenopathy  Neurological: She is alert and oriented to person, place, and time  Skin: She is not diaphoretic  Psychiatric: She has a normal mood and affect  Her behavior is normal  Judgment and thought content normal    Nursing note and vitals reviewed

## 2018-03-06 LAB
25(OH)D3 SERPL-MCNC: 21 NG/ML (ref 30–100)
B19V IGG SER IA-ACNC: 0.3 INDEX (ref 0–0.8)
B19V IGM SER IA-ACNC: 0.2 INDEX (ref 0–0.8)
EBV EA IGG SER-ACNC: <9 U/ML (ref 0–8.9)
EBV NA IGG SER IA-ACNC: 114 U/ML (ref 0–17.9)
EBV PATRN SPEC IB-IMP: ABNORMAL
EBV VCA IGG SER IA-ACNC: >600 U/ML (ref 0–17.9)
EBV VCA IGM SER IA-ACNC: <36 U/ML (ref 0–35.9)
RHEUMATOID FACT SER QL LA: NEGATIVE

## 2018-03-06 NOTE — ASSESSMENT & PLAN NOTE
Unclear etiology for all of Kasandra's symptoms at this time, but she is stable on exam   It may simply be some deconditioning with returning to an active job after returning from surgery, mixed with some mild CTS at both wrists, and tennis elbow  Discussed trying OTC wrist splints and elbow / Chopat straps  We will check a battery of non-fasting labs at this time as a precaution  She is to f/u with her PCP (Dr Charlee Stallings) in 1 month, or sooner PRN

## 2018-03-07 LAB — RYE IGE QN: NEGATIVE

## 2018-03-07 NOTE — PROGRESS NOTES
Please let the patient know that their bloodwork was normal - except for her Vit D was low at 21 -> I recommend taking an OTC supplement of 2000 IU daily; this can cause aches / joint pains when the Vit D is low  Thanks     Dr Kimberly Barton

## 2018-04-10 ENCOUNTER — TELEPHONE (OUTPATIENT)
Dept: UROLOGY | Facility: CLINIC | Age: 48
End: 2018-04-10

## 2018-04-20 ENCOUNTER — OFFICE VISIT (OUTPATIENT)
Dept: UROLOGY | Facility: CLINIC | Age: 48
End: 2018-04-20
Payer: COMMERCIAL

## 2018-04-20 VITALS
HEIGHT: 65 IN | BODY MASS INDEX: 29.66 KG/M2 | SYSTOLIC BLOOD PRESSURE: 130 MMHG | WEIGHT: 178 LBS | DIASTOLIC BLOOD PRESSURE: 76 MMHG

## 2018-04-20 DIAGNOSIS — N39.46 MIXED STRESS AND URGE URINARY INCONTINENCE: ICD-10-CM

## 2018-04-20 DIAGNOSIS — N39.8 VOIDING DYSFUNCTION: Primary | ICD-10-CM

## 2018-04-20 LAB
POST-VOID RESIDUAL VOLUME, ML POC: 96 ML
SL AMB  POCT GLUCOSE, UA: NORMAL
SL AMB LEUKOCYTE ESTERASE,UA: NORMAL
SL AMB POCT BILIRUBIN,UA: NORMAL
SL AMB POCT BLOOD,UA: NORMAL
SL AMB POCT CLARITY,UA: CLEAR
SL AMB POCT COLOR,UA: YELLOW
SL AMB POCT KETONES,UA: NORMAL
SL AMB POCT NITRITE,UA: NORMAL
SL AMB POCT PH,UA: 5
SL AMB POCT SPECIFIC GRAVITY,UA: NORMAL
SL AMB POCT URINE PROTEIN: NORMAL
SL AMB POCT UROBILINOGEN: NORMAL

## 2018-04-20 PROCEDURE — 81002 URINALYSIS NONAUTO W/O SCOPE: CPT | Performed by: UROLOGY

## 2018-04-20 PROCEDURE — 51798 US URINE CAPACITY MEASURE: CPT | Performed by: UROLOGY

## 2018-04-20 PROCEDURE — 99213 OFFICE O/P EST LOW 20 MIN: CPT | Performed by: UROLOGY

## 2018-04-20 NOTE — PROGRESS NOTES
UROLOGY PROGRESS NOTE   Patient Identifiers: Kimmy Ruiz (MRN [id number])  Date of Service: 4/20/2018    Subjective:    49year old female with mixed type incontinence  She had a TVT-O in November 2017  She reports still wearing a pad 24-7  She also uses oxybutinin  She feels a slight intermittent spasm  No burning or hematuria  MI was 96 ml  Patient has  see above      Objective:     VITALS:    Vitals:    04/20/18 0926   BP: 130/76           LABS:  Lab Results   Component Value Date    HGB 13 1 03/05/2018    HCT 40 3 03/05/2018    WBC 6 05 03/05/2018     03/05/2018   ]    Lab Results   Component Value Date     03/05/2018    K 4 2 03/05/2018     03/05/2018    CO2 27 03/05/2018    BUN 16 03/05/2018    CREATININE 0 85 03/05/2018    CALCIUM 8 6 03/05/2018    GLUCOSE 92 05/02/2015   ]    INPATIENT MEDS:    Current Outpatient Prescriptions:     ALPRAZolam (XANAX) 0 25 mg tablet, Take by mouth daily at bedtime, Disp: , Rfl:     Ascorbic Acid 120 MG CHEW, Chew daily, Disp: , Rfl:     B Complex Vitamins (VITAMIN B COMPLEX PO), Take 1 tablet by mouth daily, Disp: , Rfl:     Biotin w/ Vitamins C & E (HAIR SKIN & NAILS GUMMIES) 1250-7 5-7 5 MCG-MG-UNT CHEW, Chew daily, Disp: , Rfl:     cetirizine (ZyrTEC) 10 mg tablet, Take 10 mg by mouth daily as needed  , Disp: , Rfl:     famotidine (PEPCID) 20 mg tablet, Take 20 mg by mouth daily  , Disp: , Rfl:     Multiple Vitamins-Minerals (ONE-A-DAY WOMENS 50+ ADVANTAGE) TABS, Take by mouth, Disp: , Rfl:     norethindrone-ethinyl estradiol (JUNEL FE 1/20) 1-20 MG-MCG per tablet, Take 1 tablet by mouth daily  , Disp: , Rfl:     Omega-3 1000 MG CAPS, Take 1 tablet by mouth, Disp: , Rfl:     oxybutynin (DITROPAN-XL) 10 MG 24 hr tablet, Take 10 mg by mouth daily at bedtime, Disp: , Rfl:     Probiotic Product (ALIGN PO), Take by mouth, Disp: , Rfl:       Physical Exam:   /76 (BP Location: Left arm, Patient Position: Sitting, Cuff Size: Adult) Ht 5' 5" (1 651 m)   Wt 80 7 kg (178 lb)   BMI 29 62 kg/m²   GEN: no acute distress    RESP: breathing comfortably with no accessory muscle use    ABD: soft, non-tender, non-distended   INCISION:    EXT: no significant peripheral edema   Gu(Female): Pelvic: external genitalia normal, urethra without abnormality or discharge, no bladder tenderness, rectovaginal septum normal, no discharge, no ersion, mild post menopausal changes and urethral meatus    RADIOLOGY:   none     Assessment:   1   Mixed type urinary incontinence     Plan:   - discussed options of management  - will start with repeat urodynamic testing  - consider urethral bulking agent  - also OAB treatment -anticholinergics, botox or Intestim   -  -  -

## 2018-04-23 ENCOUNTER — OFFICE VISIT (OUTPATIENT)
Dept: FAMILY MEDICINE CLINIC | Facility: CLINIC | Age: 48
End: 2018-04-23
Payer: COMMERCIAL

## 2018-04-23 VITALS
DIASTOLIC BLOOD PRESSURE: 78 MMHG | HEART RATE: 72 BPM | WEIGHT: 178.2 LBS | HEIGHT: 65 IN | BODY MASS INDEX: 29.69 KG/M2 | SYSTOLIC BLOOD PRESSURE: 142 MMHG | RESPIRATION RATE: 14 BRPM

## 2018-04-23 DIAGNOSIS — D50.9 IRON DEFICIENCY ANEMIA, UNSPECIFIED IRON DEFICIENCY ANEMIA TYPE: ICD-10-CM

## 2018-04-23 DIAGNOSIS — N39.46 URGE AND STRESS INCONTINENCE: ICD-10-CM

## 2018-04-23 DIAGNOSIS — K21.9 GASTROESOPHAGEAL REFLUX DISEASE WITHOUT ESOPHAGITIS: Primary | ICD-10-CM

## 2018-04-23 DIAGNOSIS — E55.9 VITAMIN D DEFICIENCY: ICD-10-CM

## 2018-04-23 DIAGNOSIS — L70.9 ACNE, UNSPECIFIED ACNE TYPE: ICD-10-CM

## 2018-04-23 PROBLEM — M79.10 MYALGIA: Status: RESOLVED | Noted: 2018-03-05 | Resolved: 2018-04-23

## 2018-04-23 PROCEDURE — 99214 OFFICE O/P EST MOD 30 MIN: CPT | Performed by: FAMILY MEDICINE

## 2018-04-23 RX ORDER — CLINDAMYCIN AND BENZOYL PEROXIDE 10; 50 MG/G; MG/G
GEL TOPICAL 2 TIMES DAILY
Qty: 50 G | Refills: 3 | Status: SHIPPED | OUTPATIENT
Start: 2018-04-23 | End: 2019-05-30 | Stop reason: ALTCHOICE

## 2018-04-23 NOTE — PROGRESS NOTES
Assessment/Plan:    GERD (gastroesophageal reflux disease)  Cont pepcid    Vitamin D deficiency  Need increased vitamin D  Will take 5000 daily  Unable to tolerate [id number]    Urge and stress incontinence  Cont oxybutynin   Had bladder sling, but still with leaking           Problem List Items Addressed This Visit     Acne    Relevant Medications    clindamycin-benzoyl peroxide (BENZACLIN) gel    GERD (gastroesophageal reflux disease) - Primary     Cont pepcid         Iron deficiency anemia    Urge and stress incontinence     Cont oxybutynin   Had bladder sling, but still with leaking           Vitamin D deficiency     Need increased vitamin D  Will take 5000 daily  Unable to tolerate 46321mp         Relevant Orders    Vitamin D 25 hydroxy            Subjective:      Patient ID: Cruz Baker is a 49 y o  female  Here for follow up  Reviewed labs  Off work for 4 days and now feeling better        The following portions of the patient's history were reviewed and updated as appropriate: allergies, current medications, past family history, past medical history, past social history, past surgical history and problem list     Review of Systems   Constitutional: Positive for fatigue  HENT: Negative  Eyes: Negative  Respiratory: Negative  Cardiovascular: Negative  Gastrointestinal: Negative  Endocrine: Negative  Genitourinary: Negative  Musculoskeletal: Negative  Skin: Negative  Allergic/Immunologic: Negative  Neurological: Negative  Hematological: Negative  Psychiatric/Behavioral: Negative  Objective:      /78   Pulse 72   Resp 14   Ht 5' 5" (1 651 m)   Wt 80 8 kg (178 lb 3 2 oz)   BMI 29 65 kg/m²          Physical Exam   Constitutional: She is oriented to person, place, and time  She appears well-developed and well-nourished  HENT:   Head: Normocephalic  Eyes: EOM are normal  Pupils are equal, round, and reactive to light     Neck: Normal range of motion  Neck supple  Cardiovascular: Normal rate, regular rhythm, normal heart sounds and intact distal pulses  Pulmonary/Chest: Effort normal and breath sounds normal    Abdominal: Soft  Bowel sounds are normal    Musculoskeletal: Normal range of motion  Neurological: She is alert and oriented to person, place, and time  She has normal reflexes  Skin: Skin is warm  Nursing note and vitals reviewed

## 2018-05-22 DIAGNOSIS — F41.1 GENERALIZED ANXIETY DISORDER: Primary | ICD-10-CM

## 2018-05-24 RX ORDER — ALPRAZOLAM 0.25 MG/1
TABLET ORAL
Qty: 20 TABLET | Refills: 2 | Status: SHIPPED | OUTPATIENT
Start: 2018-05-24 | End: 2018-10-21 | Stop reason: SDUPTHER

## 2018-06-24 DIAGNOSIS — N32.81 OAB (OVERACTIVE BLADDER): Primary | ICD-10-CM

## 2018-06-25 RX ORDER — OXYBUTYNIN CHLORIDE 10 MG/1
TABLET, EXTENDED RELEASE ORAL
Qty: 30 TABLET | Refills: 1 | Status: SHIPPED | OUTPATIENT
Start: 2018-06-25 | End: 2018-08-15 | Stop reason: SDUPTHER

## 2018-06-27 ENCOUNTER — TELEPHONE (OUTPATIENT)
Dept: UROLOGY | Facility: CLINIC | Age: 48
End: 2018-06-27

## 2018-06-27 ENCOUNTER — TELEPHONE (OUTPATIENT)
Dept: FAMILY MEDICINE CLINIC | Facility: CLINIC | Age: 48
End: 2018-06-27

## 2018-06-27 NOTE — TELEPHONE ENCOUNTER
Called patient and she couldn't come in tonight but scheduled her for tomorrow after she gets out of work

## 2018-06-28 ENCOUNTER — OFFICE VISIT (OUTPATIENT)
Dept: FAMILY MEDICINE CLINIC | Facility: CLINIC | Age: 48
End: 2018-06-28
Payer: COMMERCIAL

## 2018-06-28 VITALS — WEIGHT: 181 LBS | HEIGHT: 65 IN | BODY MASS INDEX: 30.16 KG/M2 | RESPIRATION RATE: 14 BRPM

## 2018-06-28 DIAGNOSIS — L23.7 POISON IVY DERMATITIS: Primary | ICD-10-CM

## 2018-06-28 PROCEDURE — 3008F BODY MASS INDEX DOCD: CPT | Performed by: NURSE PRACTITIONER

## 2018-06-28 PROCEDURE — 99213 OFFICE O/P EST LOW 20 MIN: CPT | Performed by: NURSE PRACTITIONER

## 2018-06-28 RX ORDER — PREDNISONE 10 MG/1
TABLET ORAL
Qty: 30 TABLET | Refills: 0 | Status: SHIPPED | OUTPATIENT
Start: 2018-06-28 | End: 2019-04-08 | Stop reason: ALTCHOICE

## 2018-06-29 NOTE — PROGRESS NOTES
Assessment/Plan:           Problem List Items Addressed This Visit     None      Visit Diagnoses     Poison ivy dermatitis    -  Primary    Relevant Medications    predniSONE 10 mg tablet            Subjective:      Patient ID: Kishan Richter is a 49 y o  female  Here for poison on her arms bilat  Blistering and oozing on left wrist  Thought she washed it off  Used dove to wash off        Poison Denzel Meter   This is a new problem  The current episode started in the past 7 days  The problem is unchanged  The affected locations include the left arm and right arm  The rash is characterized by blistering, dryness, itchiness, draining, redness and swelling  She was exposed to plant contact  Pertinent negatives include no anorexia, congestion, cough, diarrhea, eye pain, facial edema, fatigue, fever, joint pain, nail changes, rhinorrhea, shortness of breath, sore throat or vomiting  Past treatments include analgesics  The treatment provided mild relief  The following portions of the patient's history were reviewed and updated as appropriate: allergies, current medications, past family history, past medical history, past social history, past surgical history and problem list     Review of Systems   Constitutional: Negative for fatigue and fever  HENT: Negative for congestion, rhinorrhea and sore throat  Eyes: Negative for photophobia, pain and visual disturbance  Respiratory: Negative for cough and shortness of breath  Gastrointestinal: Negative for anorexia, diarrhea and vomiting  Musculoskeletal: Negative for arthralgias, joint pain and myalgias  Skin: Positive for rash  Negative for nail changes  Neurological: Negative for dizziness and headaches  Objective:      Resp 14   Ht 5' 5" (1 651 m)   Wt 82 1 kg (181 lb)   BMI 30 12 kg/m²          Physical Exam   Constitutional: She is oriented to person, place, and time  She appears well-developed and well-nourished     HENT:   Head: Normocephalic and atraumatic  Right Ear: External ear normal    Left Ear: External ear normal    Eyes: Conjunctivae are normal    Cardiovascular: Normal rate, regular rhythm and normal heart sounds  Pulmonary/Chest: Effort normal and breath sounds normal    Musculoskeletal: Normal range of motion  Neurological: She is alert and oriented to person, place, and time  Skin: Skin is warm  Rash noted  Rash is maculopapular  Left arm bullous and wheeping     Psychiatric: She has a normal mood and affect  Her behavior is normal  Judgment and thought content normal    Nursing note and vitals reviewed

## 2018-07-06 DIAGNOSIS — I83.893 VARICOSE VEINS OF BOTH LEGS WITH EDEMA: Primary | ICD-10-CM

## 2018-07-11 ENCOUNTER — TELEPHONE (OUTPATIENT)
Dept: UROLOGY | Facility: CLINIC | Age: 48
End: 2018-07-11

## 2018-07-11 NOTE — TELEPHONE ENCOUNTER
Spoke to patient to reschedule previously cancelled [facility]  Is currently on Oxybutynin, still has leakage, but no "gushes", and still wears a pad  Patient wants to wait until the fall and will call us when ready to schedule

## 2018-07-27 DIAGNOSIS — Z30.41 ENCOUNTER FOR SURVEILLANCE OF CONTRACEPTIVE PILLS: Primary | ICD-10-CM

## 2018-07-27 RX ORDER — NORETHINDRONE ACETATE AND ETHINYL ESTRADIOL AND FERROUS FUMARATE 1MG-20(21)
KIT ORAL
Qty: 84 TABLET | Refills: 2 | Status: SHIPPED | OUTPATIENT
Start: 2018-07-27 | End: 2019-03-29 | Stop reason: SDUPTHER

## 2018-08-15 DIAGNOSIS — N32.81 OAB (OVERACTIVE BLADDER): ICD-10-CM

## 2018-08-16 RX ORDER — OXYBUTYNIN CHLORIDE 10 MG/1
10 TABLET, EXTENDED RELEASE ORAL
Qty: 90 TABLET | Refills: 3 | Status: SHIPPED | OUTPATIENT
Start: 2018-08-16 | End: 2019-05-30 | Stop reason: ALTCHOICE

## 2018-08-18 ENCOUNTER — OFFICE VISIT (OUTPATIENT)
Dept: URGENT CARE | Age: 48
End: 2018-08-18
Payer: COMMERCIAL

## 2018-08-18 VITALS
OXYGEN SATURATION: 99 % | SYSTOLIC BLOOD PRESSURE: 134 MMHG | RESPIRATION RATE: 18 BRPM | TEMPERATURE: 98.6 F | HEIGHT: 65 IN | WEIGHT: 181 LBS | DIASTOLIC BLOOD PRESSURE: 80 MMHG | HEART RATE: 56 BPM | BODY MASS INDEX: 30.16 KG/M2
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
## 2018-08-18 DIAGNOSIS — L60.0 INGROWN TOENAIL WITHOUT INFECTION: ICD-10-CM

## 2018-08-18 DIAGNOSIS — S80.861A NONVENOMOUS SPIDER BITE OF RIGHT LOWER LEG, INITIAL ENCOUNTER: Primary | ICD-10-CM

## 2018-08-18 PROCEDURE — G0382 LEV 3 HOSP TYPE B ED VISIT: HCPCS | Performed by: FAMILY MEDICINE

## 2018-08-18 NOTE — LETTER
August 18, 2018     Patient: Nicole Ulloa   YOB: 1970   Date of Visit: 8/18/2018       To Whom It May Concern: It is my medical opinion that Vinny Givens may return to work on 08/19/2018  If you have any questions or concerns, please don't hesitate to call           Sincerely,        Karen Taylor PA-C    CC: No Recipients

## 2018-08-18 NOTE — PATIENT INSTRUCTIONS
Spider bite of the right foot:  Advised patient is not infected and no allergic reaction  She may use ice, Aleve, elevation  Right great toe with ingrown toenail  Advised patient to do a warm soak her feet and trim her nails  If she is unable to she should follow up with Podiatry  No sign of infection at this time

## 2018-08-18 NOTE — PROGRESS NOTES
[facility]        NAME: Shawna Franco is a 49 y o  female  : 1970    MRN: [id number]  DATE: 2018  TIME: 1:31 PM    Assessment and Plan   Nonvenomous spider bite of right lower leg, initial encounter [G29 987B, W57  XXXA]  1  Nonvenomous spider bite of right lower leg, initial encounter     2  Ingrown toenail without infection           Patient Instructions     Follow up with PCP in 3-5 days  Proceed to  ER if symptoms worsen  Chief Complaint     Chief Complaint   Patient presents with    Foot Pain     possible spider bite x 4 days   pain into right toes         History of Present Illness       Patient presents for right foot pain  She states she was bit by a spider on Wednesday  She has been using over-the-counter Aleve and ice for the pain  She denies any redness, warmth, drainage from the bite  She however has pain into her right great toe  She states the toe is red tender to touch  She denies fever, chills, sweats        Review of Systems   Review of Systems   Constitutional: Negative  Respiratory: Negative  Cardiovascular: Negative  Gastrointestinal: Negative  Skin: Positive for wound  Negative for color change, pallor and rash  Current Medications       Current Outpatient Prescriptions:     ALPRAZolam (XANAX) 0 25 mg tablet, TAKE 1 TABLET AT BEDTIME AS NEEDED, Disp: 20 tablet, Rfl: 2    cetirizine (ZyrTEC) 10 mg tablet, Take 10 mg by mouth daily as needed  , Disp: , Rfl:     clindamycin-benzoyl peroxide (BENZACLIN) gel, Apply topically 2 (two) times a day, Disp: 50 g, Rfl: 3    famotidine (PEPCID) 20 mg tablet, Take 20 mg by mouth daily  , Disp: , Rfl:     JUNEL FE 1/20 1-20 MG-MCG per tablet, TAKE 1 TABLET DAILY, Disp: 84 tablet, Rfl: 2    Multiple Vitamins-Minerals (ONE-A-DAY WOMENS 50+ ADVANTAGE) TABS, Take by mouth, Disp: , Rfl:     oxybutynin (DITROPAN-XL) 10 MG 24 hr tablet, Take 1 tablet (10 mg total) by mouth daily at bedtime, Disp: 90 tablet, Rfl: 3    predniSONE 10 mg tablet, 4 tabs daily x3 days, 3 tabs daily x3d, 2 tabs daily x3d, 1 tab daily x3d, Disp: 30 tablet, Rfl: 0    Probiotic Product (ALIGN PO), Take by mouth, Disp: , Rfl:     Current Allergies     Allergies as of 08/18/2018 - Reviewed 08/18/2018   Allergen Reaction Noted    Penicillins Hives, Swelling, and Throat Swelling 07/10/2014    Sulfa antibiotics Swelling and Rash 06/13/2016    Acetaminophen Dizziness 07/10/2014    Mold extract [trichophyton] Itching 06/13/2016    Morphine and related  06/13/2016    Codeine Rash, GI Intolerance, and Headache 01/09/2015    Latex Rash 10/26/2017            The following portions of the patient's history were reviewed and updated as appropriate: allergies, current medications, past family history, past medical history, past social history, past surgical history and problem list     Objective   /80 (BP Location: Right arm, Patient Position: Sitting, Cuff Size: Standard)   Pulse 56   Temp 98 6 °F (37 °C) (Temporal)   Resp 18   Ht 5' 5" (1 651 m)   Wt 82 1 kg (181 lb)   LMP 08/09/2016   SpO2 99%   BMI 30 12 kg/m²        Physical Exam     Physical Exam   Constitutional: She appears well-developed and well-nourished  No distress  Skin:        Nursing note and vitals reviewed

## 2018-09-12 ENCOUNTER — TELEPHONE (OUTPATIENT)
Dept: UROLOGY | Facility: CLINIC | Age: 48
End: 2018-09-12

## 2018-10-21 DIAGNOSIS — F41.1 GENERALIZED ANXIETY DISORDER: ICD-10-CM

## 2018-10-22 RX ORDER — ALPRAZOLAM 0.25 MG/1
TABLET ORAL
Qty: 20 TABLET | Refills: 2 | Status: SHIPPED | OUTPATIENT
Start: 2018-10-22 | End: 2019-02-25 | Stop reason: SDUPTHER

## 2019-02-25 DIAGNOSIS — F41.1 GENERALIZED ANXIETY DISORDER: ICD-10-CM

## 2019-02-28 ENCOUNTER — APPOINTMENT (OUTPATIENT)
Dept: URGENT CARE | Facility: CLINIC | Age: 49
End: 2019-02-28
Payer: OTHER MISCELLANEOUS

## 2019-02-28 PROCEDURE — G0382 LEV 3 HOSP TYPE B ED VISIT: HCPCS | Performed by: PHYSICIAN ASSISTANT

## 2019-02-28 PROCEDURE — 99283 EMERGENCY DEPT VISIT LOW MDM: CPT | Performed by: PHYSICIAN ASSISTANT

## 2019-03-01 NOTE — TELEPHONE ENCOUNTER
Please REFILL:    ALPRAZOLAM 0 25MG TABS, TAKEN @ BEDTIME PRN, #30    Please send to: [facility] [address] [place] in chart     (this was requested Monday via the pharmacy but was never filled?)    Patient is scheduled to see Dr Cheri Fuentes on 04/08/19    Thank you!!

## 2019-03-04 ENCOUNTER — OCCMED (OUTPATIENT)
Dept: URGENT CARE | Facility: CLINIC | Age: 49
End: 2019-03-04
Payer: OTHER MISCELLANEOUS

## 2019-03-04 DIAGNOSIS — S71.131D PUNCTURE WOUND OF RIGHT THIGH, SUBSEQUENT ENCOUNTER: Primary | ICD-10-CM

## 2019-03-04 PROCEDURE — 99213 OFFICE O/P EST LOW 20 MIN: CPT | Performed by: NURSE PRACTITIONER

## 2019-03-04 RX ORDER — ALPRAZOLAM 0.25 MG/1
TABLET ORAL
Qty: 20 TABLET | Refills: 2 | Status: SHIPPED | OUTPATIENT
Start: 2019-03-04 | End: 2019-08-23 | Stop reason: SDUPTHER

## 2019-03-29 DIAGNOSIS — Z30.41 ENCOUNTER FOR SURVEILLANCE OF CONTRACEPTIVE PILLS: ICD-10-CM

## 2019-03-29 RX ORDER — NORETHINDRONE ACETATE AND ETHINYL ESTRADIOL AND FERROUS FUMARATE 1MG-20(21)
KIT ORAL
Qty: 84 TABLET | Refills: 2 | Status: SHIPPED | OUTPATIENT
Start: 2019-03-29 | End: 2019-12-06 | Stop reason: SDUPTHER

## 2019-04-08 ENCOUNTER — TELEPHONE (OUTPATIENT)
Dept: UROLOGY | Facility: CLINIC | Age: 49
End: 2019-04-08

## 2019-04-08 ENCOUNTER — OFFICE VISIT (OUTPATIENT)
Dept: FAMILY MEDICINE CLINIC | Facility: CLINIC | Age: 49
End: 2019-04-08
Payer: COMMERCIAL

## 2019-04-08 VITALS
WEIGHT: 181 LBS | HEART RATE: 63 BPM | SYSTOLIC BLOOD PRESSURE: 130 MMHG | HEIGHT: 65 IN | OXYGEN SATURATION: 98 % | TEMPERATURE: 99.1 F | BODY MASS INDEX: 30.16 KG/M2 | DIASTOLIC BLOOD PRESSURE: 70 MMHG | RESPIRATION RATE: 14 BRPM

## 2019-04-08 DIAGNOSIS — Z12.4 SCREENING FOR CERVICAL CANCER: ICD-10-CM

## 2019-04-08 DIAGNOSIS — K21.9 GASTROESOPHAGEAL REFLUX DISEASE WITHOUT ESOPHAGITIS: ICD-10-CM

## 2019-04-08 DIAGNOSIS — F41.9 ANXIETY: Primary | ICD-10-CM

## 2019-04-08 DIAGNOSIS — Z13.6 SCREENING FOR CARDIOVASCULAR CONDITION: ICD-10-CM

## 2019-04-08 DIAGNOSIS — D50.9 IRON DEFICIENCY ANEMIA, UNSPECIFIED IRON DEFICIENCY ANEMIA TYPE: ICD-10-CM

## 2019-04-08 DIAGNOSIS — R53.82 CHRONIC FATIGUE: ICD-10-CM

## 2019-04-08 DIAGNOSIS — E55.9 VITAMIN D DEFICIENCY: ICD-10-CM

## 2019-04-08 DIAGNOSIS — E21.3 HYPERPARATHYROIDISM (HCC): ICD-10-CM

## 2019-04-08 DIAGNOSIS — Z12.39 SCREENING FOR BREAST CANCER: ICD-10-CM

## 2019-04-08 DIAGNOSIS — N39.46 URGE AND STRESS INCONTINENCE: ICD-10-CM

## 2019-04-08 PROCEDURE — 3008F BODY MASS INDEX DOCD: CPT | Performed by: FAMILY MEDICINE

## 2019-04-08 PROCEDURE — 1036F TOBACCO NON-USER: CPT | Performed by: FAMILY MEDICINE

## 2019-04-08 PROCEDURE — 99214 OFFICE O/P EST MOD 30 MIN: CPT | Performed by: FAMILY MEDICINE

## 2019-04-08 RX ORDER — LANOLIN ALCOHOL/MO/W.PET/CERES
CREAM (GRAM) TOPICAL DAILY
COMMUNITY

## 2019-04-08 RX ORDER — BUSPIRONE HYDROCHLORIDE 7.5 MG/1
7.5 TABLET ORAL 2 TIMES DAILY
Qty: 60 TABLET | Refills: 5 | Status: SHIPPED | OUTPATIENT
Start: 2019-04-08 | End: 2019-06-24

## 2019-04-08 RX ORDER — CHOLECALCIFEROL (VITAMIN D3) 125 MCG
2000 CAPSULE ORAL DAILY
COMMUNITY

## 2019-04-08 RX ORDER — AZELAIC ACID 0.15 G/G
AEROSOL, FOAM TOPICAL
Refills: 0 | COMMUNITY
Start: 2019-01-08

## 2019-04-08 RX ORDER — IVERMECTIN 10 MG/G
CREAM TOPICAL
Refills: 0 | COMMUNITY
Start: 2019-01-08

## 2019-04-17 ENCOUNTER — TRANSCRIBE ORDERS (OUTPATIENT)
Dept: ADMINISTRATIVE | Facility: HOSPITAL | Age: 49
End: 2019-04-17

## 2019-04-18 ENCOUNTER — TELEPHONE (OUTPATIENT)
Dept: FAMILY MEDICINE CLINIC | Facility: CLINIC | Age: 49
End: 2019-04-18

## 2019-04-23 ENCOUNTER — HOSPITAL ENCOUNTER (OUTPATIENT)
Dept: MAMMOGRAPHY | Facility: CLINIC | Age: 49
Discharge: HOME | End: 2019-04-23
Payer: COMMERCIAL

## 2019-04-23 VITALS — WEIGHT: 181 LBS | HEIGHT: 64 IN | BODY MASS INDEX: 30.9 KG/M2

## 2019-04-23 DIAGNOSIS — R92.8 FOLLOW-UP EXAMINATION OF ABNORMAL MAMMOGRAM: ICD-10-CM

## 2019-04-23 PROCEDURE — G0279 TOMOSYNTHESIS, MAMMO: HCPCS

## 2019-04-23 PROCEDURE — 77066 DX MAMMO INCL CAD BI: CPT

## 2019-04-29 ENCOUNTER — APPOINTMENT (OUTPATIENT)
Dept: LAB | Facility: CLINIC | Age: 49
End: 2019-04-29
Payer: COMMERCIAL

## 2019-04-29 DIAGNOSIS — R53.82 CHRONIC FATIGUE: ICD-10-CM

## 2019-04-29 DIAGNOSIS — E55.9 VITAMIN D DEFICIENCY: ICD-10-CM

## 2019-04-29 DIAGNOSIS — Z13.6 SCREENING FOR CARDIOVASCULAR CONDITION: ICD-10-CM

## 2019-04-29 DIAGNOSIS — D50.9 IRON DEFICIENCY ANEMIA, UNSPECIFIED IRON DEFICIENCY ANEMIA TYPE: ICD-10-CM

## 2019-04-29 LAB
25(OH)D3 SERPL-MCNC: 36.2 NG/ML (ref 30–100)
ALBUMIN SERPL BCP-MCNC: 3.5 G/DL (ref 3.5–5)
ALP SERPL-CCNC: 46 U/L (ref 46–116)
ALT SERPL W P-5'-P-CCNC: 20 U/L (ref 12–78)
ANION GAP SERPL CALCULATED.3IONS-SCNC: 9 MMOL/L (ref 4–13)
AST SERPL W P-5'-P-CCNC: 15 U/L (ref 5–45)
BILIRUB SERPL-MCNC: 1 MG/DL (ref 0.2–1)
BUN SERPL-MCNC: 16 MG/DL (ref 5–25)
CALCIUM SERPL-MCNC: 8.4 MG/DL (ref 8.3–10.1)
CHLORIDE SERPL-SCNC: 105 MMOL/L (ref 100–108)
CHOLEST SERPL-MCNC: 146 MG/DL (ref 50–200)
CO2 SERPL-SCNC: 27 MMOL/L (ref 21–32)
CREAT SERPL-MCNC: 0.9 MG/DL (ref 0.6–1.3)
ERYTHROCYTE [DISTWIDTH] IN BLOOD BY AUTOMATED COUNT: 12.2 % (ref 11.6–15.1)
GFR SERPL CREATININE-BSD FRML MDRD: 75 ML/MIN/1.73SQ M
GLUCOSE P FAST SERPL-MCNC: 97 MG/DL (ref 65–99)
HCT VFR BLD AUTO: 45 % (ref 34.8–46.1)
HDLC SERPL-MCNC: 63 MG/DL (ref 40–60)
HGB BLD-MCNC: 13.9 G/DL (ref 11.5–15.4)
IRON SERPL-MCNC: 163 UG/DL (ref 50–170)
LDLC SERPL CALC-MCNC: 75 MG/DL (ref 0–100)
MCH RBC QN AUTO: 31.9 PG (ref 26.8–34.3)
MCHC RBC AUTO-ENTMCNC: 30.9 G/DL (ref 31.4–37.4)
MCV RBC AUTO: 103 FL (ref 82–98)
PLATELET # BLD AUTO: 176 THOUSANDS/UL (ref 149–390)
PMV BLD AUTO: 10.4 FL (ref 8.9–12.7)
POTASSIUM SERPL-SCNC: 4.3 MMOL/L (ref 3.5–5.3)
PROT SERPL-MCNC: 6.7 G/DL (ref 6.4–8.2)
RBC # BLD AUTO: 4.36 MILLION/UL (ref 3.81–5.12)
SODIUM SERPL-SCNC: 141 MMOL/L (ref 136–145)
TRIGL SERPL-MCNC: 40 MG/DL
TSH SERPL DL<=0.05 MIU/L-ACNC: 1.36 UIU/ML (ref 0.36–3.74)
WBC # BLD AUTO: 3.63 THOUSAND/UL (ref 4.31–10.16)

## 2019-04-29 PROCEDURE — 80061 LIPID PANEL: CPT

## 2019-04-29 PROCEDURE — 84443 ASSAY THYROID STIM HORMONE: CPT

## 2019-04-29 PROCEDURE — 83540 ASSAY OF IRON: CPT

## 2019-04-29 PROCEDURE — 82306 VITAMIN D 25 HYDROXY: CPT

## 2019-04-29 PROCEDURE — 36415 COLL VENOUS BLD VENIPUNCTURE: CPT

## 2019-04-29 PROCEDURE — 80053 COMPREHEN METABOLIC PANEL: CPT

## 2019-04-29 PROCEDURE — 85027 COMPLETE CBC AUTOMATED: CPT

## 2019-05-20 ENCOUNTER — TELEPHONE (OUTPATIENT)
Dept: UROLOGY | Facility: CLINIC | Age: 49
End: 2019-05-20

## 2019-05-22 ENCOUNTER — TELEPHONE (OUTPATIENT)
Dept: UROLOGY | Facility: CLINIC | Age: 49
End: 2019-05-22

## 2019-05-30 ENCOUNTER — ANNUAL EXAM (OUTPATIENT)
Dept: OBGYN CLINIC | Facility: CLINIC | Age: 49
End: 2019-05-30
Payer: COMMERCIAL

## 2019-05-30 VITALS
DIASTOLIC BLOOD PRESSURE: 80 MMHG | HEIGHT: 64 IN | BODY MASS INDEX: 30.73 KG/M2 | WEIGHT: 180 LBS | SYSTOLIC BLOOD PRESSURE: 122 MMHG

## 2019-05-30 DIAGNOSIS — Z12.4 PAP SMEAR FOR CERVICAL CANCER SCREENING: ICD-10-CM

## 2019-05-30 DIAGNOSIS — Z01.419 WOMEN'S ANNUAL ROUTINE GYNECOLOGICAL EXAMINATION: Primary | ICD-10-CM

## 2019-05-30 DIAGNOSIS — B37.3 YEAST VAGINITIS: ICD-10-CM

## 2019-05-30 PROCEDURE — S0612 ANNUAL GYNECOLOGICAL EXAMINA: HCPCS | Performed by: OBSTETRICS & GYNECOLOGY

## 2019-05-30 RX ORDER — TERCONAZOLE 80 MG/1
80 SUPPOSITORY VAGINAL
Qty: 3 SUPPOSITORY | Refills: 0 | Status: SHIPPED | OUTPATIENT
Start: 2019-05-30 | End: 2019-06-02

## 2019-05-31 DIAGNOSIS — N39.46 MIXED STRESS AND URGE URINARY INCONTINENCE: Primary | ICD-10-CM

## 2019-05-31 RX ORDER — OXYBUTYNIN CHLORIDE 10 MG/1
10 TABLET, EXTENDED RELEASE ORAL
Qty: 90 TABLET | Refills: 0 | Status: SHIPPED | OUTPATIENT
Start: 2019-05-31 | End: 2019-06-05

## 2019-06-02 LAB — SL AMB GENITAL CULTURE: NORMAL

## 2019-06-05 ENCOUNTER — OFFICE VISIT (OUTPATIENT)
Dept: UROLOGY | Facility: CLINIC | Age: 49
End: 2019-06-05
Payer: COMMERCIAL

## 2019-06-05 ENCOUNTER — TELEPHONE (OUTPATIENT)
Dept: UROLOGY | Facility: CLINIC | Age: 49
End: 2019-06-05

## 2019-06-05 VITALS
HEIGHT: 64 IN | BODY MASS INDEX: 31.07 KG/M2 | DIASTOLIC BLOOD PRESSURE: 80 MMHG | WEIGHT: 182 LBS | SYSTOLIC BLOOD PRESSURE: 138 MMHG

## 2019-06-05 DIAGNOSIS — N39.46 URGE AND STRESS INCONTINENCE: Primary | ICD-10-CM

## 2019-06-05 DIAGNOSIS — K59.04 CHRONIC IDIOPATHIC CONSTIPATION: ICD-10-CM

## 2019-06-05 DIAGNOSIS — Z96.0 HISTORY OF PUBOVAGINAL SLING: ICD-10-CM

## 2019-06-05 LAB
HPV HR 12 DNA CVX QL NAA+PROBE: NOT DETECTED
HPV16 DNA SPEC QL NAA+PROBE: NOT DETECTED
HPV18 DNA SPEC QL NAA+PROBE: NOT DETECTED
POST-VOID RESIDUAL VOLUME, ML POC: 0 ML
SL AMB  POCT GLUCOSE, UA: NORMAL
SL AMB LEUKOCYTE ESTERASE,UA: NORMAL
SL AMB POCT BILIRUBIN,UA: NORMAL
SL AMB POCT BLOOD,UA: NORMAL
SL AMB POCT CLARITY,UA: CLEAR
SL AMB POCT COLOR,UA: YELLOW
SL AMB POCT KETONES,UA: NORMAL
SL AMB POCT NITRITE,UA: NORMAL
SL AMB POCT PH,UA: 5
SL AMB POCT SPECIFIC GRAVITY,UA: 1.01
SL AMB POCT URINE PROTEIN: NORMAL
SL AMB POCT UROBILINOGEN: 0.2
THIN PREP CVX: NORMAL

## 2019-06-05 PROCEDURE — 99214 OFFICE O/P EST MOD 30 MIN: CPT | Performed by: UROLOGY

## 2019-06-05 PROCEDURE — 81002 URINALYSIS NONAUTO W/O SCOPE: CPT | Performed by: UROLOGY

## 2019-06-05 PROCEDURE — 51798 US URINE CAPACITY MEASURE: CPT | Performed by: UROLOGY

## 2019-06-05 RX ORDER — TOLTERODINE TARTRATE 2 MG/1
2 TABLET, EXTENDED RELEASE ORAL 2 TIMES DAILY
Qty: 60 TABLET | Refills: 3 | Status: SHIPPED | OUTPATIENT
Start: 2019-06-05 | End: 2019-07-08 | Stop reason: SINTOL

## 2019-06-24 ENCOUNTER — PROCEDURE VISIT (OUTPATIENT)
Dept: UROLOGY | Facility: CLINIC | Age: 49
End: 2019-06-24
Payer: COMMERCIAL

## 2019-06-24 VITALS
HEART RATE: 84 BPM | DIASTOLIC BLOOD PRESSURE: 76 MMHG | BODY MASS INDEX: 30.42 KG/M2 | WEIGHT: 178.2 LBS | HEIGHT: 64 IN | SYSTOLIC BLOOD PRESSURE: 122 MMHG

## 2019-06-24 DIAGNOSIS — N39.46 MIXED STRESS AND URGE URINARY INCONTINENCE: Primary | ICD-10-CM

## 2019-06-24 DIAGNOSIS — N39.46 URGE AND STRESS INCONTINENCE: ICD-10-CM

## 2019-06-24 DIAGNOSIS — N32.81 OAB (OVERACTIVE BLADDER): ICD-10-CM

## 2019-06-24 LAB
SL AMB  POCT GLUCOSE, UA: NORMAL
SL AMB LEUKOCYTE ESTERASE,UA: NORMAL
SL AMB POCT BILIRUBIN,UA: NORMAL
SL AMB POCT BLOOD,UA: NORMAL
SL AMB POCT CLARITY,UA: CLEAR
SL AMB POCT COLOR,UA: YELLOW
SL AMB POCT KETONES,UA: NORMAL
SL AMB POCT NITRITE,UA: NORMAL
SL AMB POCT PH,UA: 5
SL AMB POCT SPECIFIC GRAVITY,UA: 1.01
SL AMB POCT URINE PROTEIN: NORMAL
SL AMB POCT UROBILINOGEN: NORMAL

## 2019-06-24 PROCEDURE — 81002 URINALYSIS NONAUTO W/O SCOPE: CPT | Performed by: UROLOGY

## 2019-06-24 PROCEDURE — 99213 OFFICE O/P EST LOW 20 MIN: CPT | Performed by: UROLOGY

## 2019-06-24 PROCEDURE — 52000 CYSTOURETHROSCOPY: CPT | Performed by: UROLOGY

## 2019-06-26 ENCOUNTER — PROCEDURE VISIT (OUTPATIENT)
Dept: UROLOGY | Facility: AMBULATORY SURGERY CENTER | Age: 49
End: 2019-06-26
Payer: COMMERCIAL

## 2019-06-26 ENCOUNTER — TELEPHONE (OUTPATIENT)
Dept: UROLOGY | Facility: AMBULATORY SURGERY CENTER | Age: 49
End: 2019-06-26

## 2019-06-26 VITALS
HEIGHT: 64 IN | DIASTOLIC BLOOD PRESSURE: 80 MMHG | HEART RATE: 72 BPM | SYSTOLIC BLOOD PRESSURE: 148 MMHG | WEIGHT: 180.6 LBS | BODY MASS INDEX: 30.83 KG/M2

## 2019-06-26 DIAGNOSIS — N32.81 OAB (OVERACTIVE BLADDER): ICD-10-CM

## 2019-06-26 DIAGNOSIS — N39.8 VOIDING DYSFUNCTION: ICD-10-CM

## 2019-06-26 DIAGNOSIS — N39.46 MIXED STRESS AND URGE URINARY INCONTINENCE: Primary | ICD-10-CM

## 2019-06-26 LAB
SL AMB  POCT GLUCOSE, UA: NORMAL
SL AMB LEUKOCYTE ESTERASE,UA: NORMAL
SL AMB POCT BILIRUBIN,UA: NORMAL
SL AMB POCT BLOOD,UA: NORMAL
SL AMB POCT CLARITY,UA: CLEAR
SL AMB POCT COLOR,UA: YELLOW
SL AMB POCT KETONES,UA: NORMAL
SL AMB POCT NITRITE,UA: NORMAL
SL AMB POCT PH,UA: 6
SL AMB POCT SPECIFIC GRAVITY,UA: 1
SL AMB POCT URINE PROTEIN: NORMAL
SL AMB POCT UROBILINOGEN: NORMAL

## 2019-06-26 PROCEDURE — 51741 ELECTRO-UROFLOWMETRY FIRST: CPT

## 2019-06-26 PROCEDURE — 81002 URINALYSIS NONAUTO W/O SCOPE: CPT | Performed by: UROLOGY

## 2019-06-26 PROCEDURE — 51728 CYSTOMETROGRAM W/VP: CPT

## 2019-06-26 PROCEDURE — 51784 ANAL/URINARY MUSCLE STUDY: CPT | Performed by: UROLOGY

## 2019-06-26 PROCEDURE — 51797 INTRAABDOMINAL PRESSURE TEST: CPT

## 2019-07-08 DIAGNOSIS — N32.81 OAB (OVERACTIVE BLADDER): Primary | ICD-10-CM

## 2019-07-08 RX ORDER — OXYBUTYNIN CHLORIDE 10 MG/1
10 TABLET, EXTENDED RELEASE ORAL
Qty: 90 TABLET | Refills: 3 | Status: SHIPPED | OUTPATIENT
Start: 2019-07-08 | End: 2020-07-29

## 2019-07-11 ENCOUNTER — OFFICE VISIT (OUTPATIENT)
Dept: UROLOGY | Facility: AMBULATORY SURGERY CENTER | Age: 49
End: 2019-07-11
Payer: COMMERCIAL

## 2019-07-11 VITALS
DIASTOLIC BLOOD PRESSURE: 74 MMHG | HEIGHT: 64 IN | SYSTOLIC BLOOD PRESSURE: 130 MMHG | BODY MASS INDEX: 30.7 KG/M2 | HEART RATE: 68 BPM | WEIGHT: 179.8 LBS

## 2019-07-11 DIAGNOSIS — N39.46 URGE AND STRESS INCONTINENCE: Primary | ICD-10-CM

## 2019-07-11 PROCEDURE — 99215 OFFICE O/P EST HI 40 MIN: CPT | Performed by: UROLOGY

## 2019-07-11 NOTE — ASSESSMENT & PLAN NOTE
I had a very lengthy discussion with the patient  First we reviewed her recent urodynamic testing  Her capacity is good and there was no signs of uninhibited bladder contraction  She did however have abnormal sensation  We discussed treatment options for refractory urinary urgency and urge incontinence  We discussed Botox injection, percutaneous tibial nerve stimulation, and InterStim sacral neuromodulation  The risks and benefits of all these options were discussed in detail  The patient is leaning towards peripheral nerve evaluation for possible InterStim placement  She would like to think about this a little longer and will contact us if she wants to proceed  I went ahead and consented her for surgery to place an InterStim device in case she wants to proceed

## 2019-07-11 NOTE — PROGRESS NOTES
Assessment/Plan:    Urge and stress incontinence  I had a very lengthy discussion with the patient  First we reviewed her recent urodynamic testing  Her capacity is good and there was no signs of uninhibited bladder contraction  She did however have abnormal sensation  We discussed treatment options for refractory urinary urgency and urge incontinence  We discussed Botox injection, percutaneous tibial nerve stimulation, and InterStim sacral neuromodulation  The risks and benefits of all these options were discussed in detail  The patient is leaning towards peripheral nerve evaluation for possible InterStim placement  She would like to think about this a little longer and will contact us if she wants to proceed  I went ahead and consented her for surgery to place an InterStim device in case she wants to proceed  Diagnoses and all orders for this visit:    Urge and stress incontinence          Total visit time was 40 minutes of which over 50% was spent on counseling  Subjective:     Patient ID: Amber Madrigal is a 51 y o  female    58-year-old female presents for evaluation refractory urinary urgency and urge incontinence  The patient previously underwent mid urethral sling placement for stress incontinence  Since then she has been having problems with urinary urgency and urge incontinence  Her pad use varies from 3-8 daily  She has tried numerous anticholinergic medications  Detrol gave her too many side effects and did not help her symptoms  Oxybutynin is what she is currently taking and it does seem to help somewhat but she continues to have leakage and has daily headaches from the medication  She was previously prescribed Myrbetriq but this was too expensive  She did undergo urodynamic testing recently  She has no other complaints          The following portions of the patient's history were reviewed and updated as appropriate: allergies, current medications, past family history, past medical history, past social history, past surgical history and problem list     Review of Systems   Constitutional: Negative  HENT: Negative  Eyes: Negative  Respiratory: Negative  Cardiovascular: Negative  Gastrointestinal: Negative  Endocrine: Negative  Genitourinary:        As noted per HPI   Musculoskeletal: Negative  Skin: Negative  Allergic/Immunologic: Negative  Neurological: Negative  Hematological: Negative  Psychiatric/Behavioral: Negative  Objective:    Physical Exam   Constitutional: She is oriented to person, place, and time  She appears well-developed and well-nourished  HENT:   Head: Normocephalic and atraumatic  Neck: Normal range of motion  Neck supple  Cardiovascular: Intact distal pulses  Pulmonary/Chest: Effort normal    Abdominal: Soft  Bowel sounds are normal  She exhibits no distension and no mass  There is no tenderness  There is no rebound and no guarding  Musculoskeletal: Normal range of motion  Neurological: She is alert and oriented to person, place, and time  Skin: Skin is warm and dry  Psychiatric: She has a normal mood and affect  Vitals reviewed          Results  No results found for: PSA  Lab Results   Component Value Date    GLUCOSE 92 05/02/2015    CALCIUM 8 4 04/29/2019     05/02/2015    K 4 3 04/29/2019    CO2 27 04/29/2019     04/29/2019    BUN 16 04/29/2019    CREATININE 0 90 04/29/2019     Lab Results   Component Value Date    WBC 3 63 (L) 04/29/2019    HGB 13 9 04/29/2019    HCT 45 0 04/29/2019     (H) 04/29/2019     04/29/2019       No results found for this or any previous visit (from the past 1 hour(s)) ]

## 2019-08-06 ENCOUNTER — APPOINTMENT (OUTPATIENT)
Dept: LAB | Facility: CLINIC | Age: 49
End: 2019-08-06
Payer: COMMERCIAL

## 2019-08-06 ENCOUNTER — OFFICE VISIT (OUTPATIENT)
Dept: FAMILY MEDICINE CLINIC | Facility: CLINIC | Age: 49
End: 2019-08-06
Payer: COMMERCIAL

## 2019-08-06 VITALS
SYSTOLIC BLOOD PRESSURE: 140 MMHG | DIASTOLIC BLOOD PRESSURE: 80 MMHG | HEART RATE: 64 BPM | TEMPERATURE: 98 F | OXYGEN SATURATION: 99 % | BODY MASS INDEX: 30.7 KG/M2 | WEIGHT: 179.8 LBS | RESPIRATION RATE: 13 BRPM | HEIGHT: 64 IN

## 2019-08-06 DIAGNOSIS — E21.3 HYPERPARATHYROIDISM (HCC): ICD-10-CM

## 2019-08-06 DIAGNOSIS — N39.46 URGE AND STRESS INCONTINENCE: Primary | ICD-10-CM

## 2019-08-06 DIAGNOSIS — K21.9 GASTROESOPHAGEAL REFLUX DISEASE WITHOUT ESOPHAGITIS: ICD-10-CM

## 2019-08-06 DIAGNOSIS — D50.9 IRON DEFICIENCY ANEMIA, UNSPECIFIED IRON DEFICIENCY ANEMIA TYPE: ICD-10-CM

## 2019-08-06 DIAGNOSIS — F41.9 ANXIETY: ICD-10-CM

## 2019-08-06 LAB — PTH-INTACT SERPL-MCNC: 67.2 PG/ML (ref 18.4–80.1)

## 2019-08-06 PROCEDURE — 1036F TOBACCO NON-USER: CPT | Performed by: FAMILY MEDICINE

## 2019-08-06 PROCEDURE — 36415 COLL VENOUS BLD VENIPUNCTURE: CPT

## 2019-08-06 PROCEDURE — 83970 ASSAY OF PARATHORMONE: CPT

## 2019-08-06 PROCEDURE — 99214 OFFICE O/P EST MOD 30 MIN: CPT | Performed by: FAMILY MEDICINE

## 2019-08-06 NOTE — PROGRESS NOTES
Assessment/Plan:    Problem List Items Addressed This Visit        Digestive    GERD (gastroesophageal reflux disease)     Using pepcid prn            Endocrine    Hyperparathyroidism ([address] )     Check pth  Normal vitamin d level         Relevant Orders    PTH, intact       Other    Anxiety     Xanax prn  Tried buspar- made her dizzy         Iron deficiency anemia     Iron level ok         Urge and stress incontinence - Primary     Seen by urology  Has to make decision about botox, stim                BMI Counseling: Body mass index is 30 86 kg/m²  Discussed the patient's BMI with her  The BMI is above average  BMI counseling and education was provided to the patient  Nutrition recommendations include reducing portion sizes and 3-5 servings of fruits/vegetables daily  Exercise recommendations include exercising 3-5 times per week  There are no Patient Instructions on file for this visit  No follow-ups on file  Subjective:      Patient ID: Nikki De Leon is a 51 y o  female  Chief Complaint   Patient presents with    Follow-up     Patient here for 4 month follow up Anxiety, Gastroesophageal reflux        Here for follow up  Looking for another job  Working 11 hours in a hot [employer]  Overall healthwise doing ok  Seen by urology- had testing done- recommended stim vs botox      The following portions of the patient's history were reviewed and updated as appropriate: allergies, current medications, past family history, past medical history, past social history, past surgical history and problem list     Review of Systems   Constitutional: Negative  HENT: Negative  Eyes: Negative  Respiratory: Negative  Cardiovascular: Negative  Gastrointestinal: Negative  Endocrine: Negative  Genitourinary: Positive for urgency  Musculoskeletal: Negative  Skin: Negative  Allergic/Immunologic: Negative  Neurological: Negative  Hematological: Negative      Psychiatric/Behavioral: Negative  Current Outpatient Medications   Medication Sig Dispense Refill    ALPRAZolam (XANAX) 0 25 mg tablet TAKE 1 TABLET BY MOUTH EVERY DAY AT BEDTIME AS NEEDED 20 tablet 2    cetirizine (ZyrTEC) 10 mg tablet Take 10 mg by mouth daily as needed   cyanocobalamin (VITAMIN B-12) 1,000 mcg tablet Take by mouth daily      famotidine (PEPCID) 20 mg tablet Take 20 mg by mouth daily   JUNEL FE 1/20 1-20 MG-MCG per tablet TAKE 1 TABLET DAILY 84 tablet 2    Multiple Vitamins-Minerals (ONE-A-DAY WOMENS 50+ ADVANTAGE) TABS Take by mouth      oxybutynin (DITROPAN-XL) 10 MG 24 hr tablet Take 1 tablet (10 mg total) by mouth daily at bedtime 90 tablet 3    Probiotic Product (ALIGN PO) Take by mouth      SOOLANTRA 1 % CREA APPLY EVERY MORNING TO FACE  0    Wheat Dextrin (BENEFIBER PO) Take by mouth      Cholecalciferol (VITAMIN D3) 2000 units TABS Take 2,000 Units by mouth daily      FINACEA 15 % FOAM EVERY NIGHT AT BEDTIME TO FACIAL ROSACEA  0     No current facility-administered medications for this visit  Objective:    /80   Pulse 64   Temp 98 °F (36 7 °C)   Resp 13   Ht 5' 4" (1 626 m)   Wt 81 6 kg (179 lb 12 8 oz)   LMP 08/09/2016   SpO2 99%   BMI 30 86 kg/m²        Physical Exam   Constitutional: She appears well-developed and well-nourished  HENT:   Head: Normocephalic and atraumatic  Eyes: Pupils are equal, round, and reactive to light  EOM are normal    Neck: Normal range of motion  Neck supple  Cardiovascular: Normal rate, regular rhythm, normal heart sounds and intact distal pulses  Pulmonary/Chest: Effort normal and breath sounds normal    Abdominal: Soft  Bowel sounds are normal    Musculoskeletal: She exhibits tenderness  Neurological: She is alert  Skin: Skin is warm  Capillary refill takes less than 2 seconds  Psychiatric: She has a normal mood and affect  Her behavior is normal    Nursing note and vitals reviewed               Florina Corona DO

## 2019-08-23 DIAGNOSIS — F41.1 GENERALIZED ANXIETY DISORDER: ICD-10-CM

## 2019-08-23 RX ORDER — ALPRAZOLAM 0.25 MG/1
TABLET ORAL
Qty: 20 TABLET | Refills: 2 | Status: SHIPPED | OUTPATIENT
Start: 2019-08-23 | End: 2020-01-29

## 2019-08-24 RX ORDER — ALPRAZOLAM 0.25 MG/1
0.25 TABLET ORAL
Qty: 20 TABLET | Refills: 0 | OUTPATIENT
Start: 2019-08-24

## 2019-09-23 ENCOUNTER — TELEPHONE (OUTPATIENT)
Dept: FAMILY MEDICINE CLINIC | Facility: CLINIC | Age: 49
End: 2019-09-23

## 2019-09-23 DIAGNOSIS — H54.3 DECREASED VISION IN BOTH EYES: Primary | ICD-10-CM

## 2019-09-23 NOTE — TELEPHONE ENCOUNTER
Patient recently saw her eye doctor for changes in her vision  Her doctor said her vision has changed significantly and wanted her notify her PCP  She also reports increased urination, dry skin & fatigue  Patient is requesting labs to check for diabetes  Can you order these without seeing her? Please advise

## 2019-09-27 ENCOUNTER — TELEPHONE (OUTPATIENT)
Dept: FAMILY MEDICINE CLINIC | Facility: CLINIC | Age: 49
End: 2019-09-27

## 2019-09-27 NOTE — TELEPHONE ENCOUNTER
PT called and wanted Dr Haskins to put in an order to check for her thyroid levels today, as she is going to get labs tomorrow morning

## 2019-10-01 ENCOUNTER — APPOINTMENT (OUTPATIENT)
Dept: LAB | Facility: CLINIC | Age: 49
End: 2019-10-01
Payer: COMMERCIAL

## 2019-10-01 DIAGNOSIS — H54.3 DECREASED VISION IN BOTH EYES: ICD-10-CM

## 2019-10-01 LAB
ALBUMIN SERPL BCP-MCNC: 3.5 G/DL (ref 3.5–5)
ALP SERPL-CCNC: 46 U/L (ref 46–116)
ALT SERPL W P-5'-P-CCNC: 19 U/L (ref 12–78)
ANION GAP SERPL CALCULATED.3IONS-SCNC: 8 MMOL/L (ref 4–13)
AST SERPL W P-5'-P-CCNC: 16 U/L (ref 5–45)
BILIRUB SERPL-MCNC: 0.9 MG/DL (ref 0.2–1)
BUN SERPL-MCNC: 14 MG/DL (ref 5–25)
CALCIUM SERPL-MCNC: 8.5 MG/DL (ref 8.3–10.1)
CHLORIDE SERPL-SCNC: 105 MMOL/L (ref 100–108)
CO2 SERPL-SCNC: 28 MMOL/L (ref 21–32)
CREAT SERPL-MCNC: 0.9 MG/DL (ref 0.6–1.3)
EST. AVERAGE GLUCOSE BLD GHB EST-MCNC: 117 MG/DL
GFR SERPL CREATININE-BSD FRML MDRD: 75 ML/MIN/1.73SQ M
GLUCOSE P FAST SERPL-MCNC: 98 MG/DL (ref 65–99)
HBA1C MFR BLD: 5.7 % (ref 4.2–6.3)
POTASSIUM SERPL-SCNC: 4 MMOL/L (ref 3.5–5.3)
PROT SERPL-MCNC: 6.4 G/DL (ref 6.4–8.2)
SODIUM SERPL-SCNC: 141 MMOL/L (ref 136–145)

## 2019-10-01 PROCEDURE — 80053 COMPREHEN METABOLIC PANEL: CPT

## 2019-10-01 PROCEDURE — 36415 COLL VENOUS BLD VENIPUNCTURE: CPT

## 2019-10-01 PROCEDURE — 83036 HEMOGLOBIN GLYCOSYLATED A1C: CPT

## 2019-12-06 DIAGNOSIS — Z30.41 ENCOUNTER FOR SURVEILLANCE OF CONTRACEPTIVE PILLS: ICD-10-CM

## 2019-12-06 RX ORDER — NORETHINDRONE ACETATE AND ETHINYL ESTRADIOL AND FERROUS FUMARATE 1MG-20(21)
KIT ORAL
Qty: 84 TABLET | Refills: 2 | Status: SHIPPED | OUTPATIENT
Start: 2019-12-06

## 2020-01-29 DIAGNOSIS — F41.1 GENERALIZED ANXIETY DISORDER: ICD-10-CM

## 2020-01-29 RX ORDER — ALPRAZOLAM 0.25 MG/1
TABLET ORAL
Qty: 20 TABLET | Refills: 0 | Status: SHIPPED | OUTPATIENT
Start: 2020-01-29 | End: 2020-04-13

## 2020-04-11 DIAGNOSIS — F41.1 GENERALIZED ANXIETY DISORDER: ICD-10-CM

## 2020-04-13 RX ORDER — ALPRAZOLAM 0.25 MG/1
TABLET ORAL
Qty: 20 TABLET | Refills: 0 | Status: SHIPPED | OUTPATIENT
Start: 2020-04-13 | End: 2020-06-16

## 2020-06-16 DIAGNOSIS — F41.1 GENERALIZED ANXIETY DISORDER: ICD-10-CM

## 2020-06-16 RX ORDER — ALPRAZOLAM 0.25 MG/1
TABLET ORAL
Qty: 20 TABLET | Refills: 0 | Status: SHIPPED | OUTPATIENT
Start: 2020-06-16 | End: 2020-08-17

## 2020-06-22 ENCOUNTER — OFFICE VISIT (OUTPATIENT)
Dept: FAMILY MEDICINE CLINIC | Facility: CLINIC | Age: 50
End: 2020-06-22

## 2020-06-22 VITALS
SYSTOLIC BLOOD PRESSURE: 160 MMHG | HEIGHT: 64 IN | BODY MASS INDEX: 33.7 KG/M2 | WEIGHT: 197.4 LBS | HEART RATE: 74 BPM | OXYGEN SATURATION: 97 % | DIASTOLIC BLOOD PRESSURE: 90 MMHG | RESPIRATION RATE: 18 BRPM | TEMPERATURE: 99.6 F

## 2020-06-22 DIAGNOSIS — F41.9 ANXIETY: Primary | ICD-10-CM

## 2020-06-22 PROCEDURE — 99213 OFFICE O/P EST LOW 20 MIN: CPT | Performed by: FAMILY MEDICINE

## 2020-06-22 PROCEDURE — 3008F BODY MASS INDEX DOCD: CPT | Performed by: FAMILY MEDICINE

## 2020-06-22 PROCEDURE — 1036F TOBACCO NON-USER: CPT | Performed by: FAMILY MEDICINE

## 2020-06-26 ENCOUNTER — TELEPHONE (OUTPATIENT)
Dept: FAMILY MEDICINE CLINIC | Facility: CLINIC | Age: 50
End: 2020-06-26

## 2020-06-27 ENCOUNTER — HOSPITAL ENCOUNTER (EMERGENCY)
Facility: HOSPITAL | Age: 50
Discharge: HOME | End: 2020-06-27
Attending: EMERGENCY MEDICINE

## 2020-06-27 VITALS
SYSTOLIC BLOOD PRESSURE: 141 MMHG | BODY MASS INDEX: 32.95 KG/M2 | WEIGHT: 193 LBS | HEART RATE: 84 BPM | OXYGEN SATURATION: 100 % | DIASTOLIC BLOOD PRESSURE: 75 MMHG | RESPIRATION RATE: 18 BRPM | HEIGHT: 64 IN | TEMPERATURE: 99.3 F

## 2020-06-27 DIAGNOSIS — F15.23 CAFFEINE WITHDRAWAL (HCC): Primary | ICD-10-CM

## 2020-06-27 DIAGNOSIS — R51.9 ACUTE NONINTRACTABLE HEADACHE, UNSPECIFIED HEADACHE TYPE: ICD-10-CM

## 2020-06-27 PROCEDURE — 99283 EMERGENCY DEPT VISIT LOW MDM: CPT

## 2020-06-27 PROCEDURE — 99282 EMERGENCY DEPT VISIT SF MDM: CPT | Performed by: EMERGENCY MEDICINE

## 2020-06-27 RX ORDER — IBUPROFEN 400 MG/1
400 TABLET ORAL ONCE
Status: COMPLETED | OUTPATIENT
Start: 2020-06-27 | End: 2020-06-27

## 2020-06-27 RX ADMIN — IBUPROFEN 400 MG: 400 TABLET ORAL at 10:13

## 2020-07-07 ENCOUNTER — SOCIAL WORK (OUTPATIENT)
Dept: BEHAVIORAL/MENTAL HEALTH CLINIC | Facility: CLINIC | Age: 50
End: 2020-07-07

## 2020-07-07 DIAGNOSIS — F41.9 ANXIETY: Primary | ICD-10-CM

## 2020-07-07 PROCEDURE — 1036F TOBACCO NON-USER: CPT | Performed by: SOCIAL WORKER

## 2020-07-07 PROCEDURE — 90832 PSYTX W PT 30 MINUTES: CPT | Performed by: SOCIAL WORKER

## 2020-07-07 NOTE — PSYCH
Assessment/Plan: Manage anxiety     There are no diagnoses linked to this encounter  Subjective: Continues to report daily struggles with stress and anxiety  No longer taking Zoloft as she was convinced this was creating headaches as opposed to caffeine withdrawal  Continues to deal with financial distress as she remains unemployed  She has largely become sedentary due to daughter's concerns over pandemic  Has not been exercising and getting out of home regulalry around others in a protected environment  Some depressive symptoms as she has had some decreased energy and motivation  Weight gain contributing as well  No SI  Patient ID: Yari Bowling is a 47 y o  female  Met with Leida De Leon for 30 minutes from 9:00AM-9:30AM  Has been more sedentary and isolative due to financial issues but also due to daughter's concerns over pandemic  Review of Systems   Psychiatric/Behavioral: The patient is nervous/anxious  Objective: Leida De Leon presents as anxious and overwhelmed but is pleasant, verbal, cooperative and well oriented during session       Physical Exam   Psychiatric: Her behavior is normal  Judgment and thought content normal

## 2020-07-07 NOTE — PATIENT INSTRUCTIONS
Processed stressors with her during session- supportive therapy provided  Encouraged her to re-establish a pattern of exercise at least 2-3 days a week to better manage stress and mood issues as this has been her go to outlet previously  With no insurance, she is unable to afford therapy sessions  Provided the registration info for [employer] and reviewed this online mental health platform and its modules on anxiety depression, depression and anxiety and stress including stress related to Covid  Agrees top register and utilize this service  Provided my contact information and she agrees to reach out to me over next couple weeks to report on her status

## 2020-07-14 ENCOUNTER — OFFICE VISIT (OUTPATIENT)
Dept: FAMILY MEDICINE CLINIC | Facility: CLINIC | Age: 50
End: 2020-07-14

## 2020-07-14 VITALS
SYSTOLIC BLOOD PRESSURE: 120 MMHG | BODY MASS INDEX: 33.67 KG/M2 | HEIGHT: 64 IN | DIASTOLIC BLOOD PRESSURE: 70 MMHG | TEMPERATURE: 98.9 F | HEART RATE: 71 BPM | OXYGEN SATURATION: 98 % | WEIGHT: 197.2 LBS | RESPIRATION RATE: 16 BRPM

## 2020-07-14 DIAGNOSIS — T78.40XA ALLERGIC REACTION, INITIAL ENCOUNTER: Primary | ICD-10-CM

## 2020-07-14 DIAGNOSIS — F41.9 ANXIETY: ICD-10-CM

## 2020-07-14 PROCEDURE — 99213 OFFICE O/P EST LOW 20 MIN: CPT | Performed by: FAMILY MEDICINE

## 2020-07-14 PROCEDURE — 1036F TOBACCO NON-USER: CPT | Performed by: FAMILY MEDICINE

## 2020-07-14 PROCEDURE — 3008F BODY MASS INDEX DOCD: CPT | Performed by: FAMILY MEDICINE

## 2020-07-14 RX ORDER — ESCITALOPRAM OXALATE 10 MG/1
10 TABLET ORAL DAILY
Qty: 30 TABLET | Refills: 5 | Status: SHIPPED | OUTPATIENT
Start: 2020-07-14

## 2020-07-14 NOTE — PROGRESS NOTES
Assessment/Plan:    1  Allergic reaction, initial encounter  Assessment & Plan:  Trial zrytec and benedryl   And clal me if worse      2  Anxiety  -     escitalopram (LEXAPRO) 10 mg tablet; Take 1 tablet (10 mg total) by mouth daily         There are no Patient Instructions on file for this visit  No follow-ups on file  Subjective:      Patient ID: Orlando Newsome is a 47 y o  female  Chief Complaint   Patient presents with    Rash     Patient here with lump red rash on base of the neck and goes down to upper chest  red hot itchy some times        Started 7 days ago  Thought it was a bug bite but bright band over chest  Burns and red to touch  Never itchy  Burned with products-hydrocortisone cream, monika revital lift glycolic acid and sunscreen, only used twice  And bug cream  Taking advil and using ice    Rash   This is a new problem  The current episode started in the past 7 days  The affected locations include the chest  She was exposed to chemicals  Pertinent negatives include no shortness of breath  Past treatments include cold compress, topical steroids and anti-itch cream  The treatment provided no relief  The following portions of the patient's history were reviewed and updated as appropriate:  past social history    Review of Systems   Constitutional: Negative  HENT: Negative  Eyes: Negative  Respiratory: Negative for shortness of breath  Cardiovascular: Negative  Gastrointestinal: Negative  Endocrine: Negative  Genitourinary: Negative  Musculoskeletal: Negative  Skin: Positive for rash  Allergic/Immunologic: Negative  Neurological: Negative  Hematological: Negative  Psychiatric/Behavioral: The patient is nervous/anxious            Current Outpatient Medications   Medication Sig Dispense Refill    ALPRAZolam (XANAX) 0 25 mg tablet TAKE 1 TABLET BY MOUTH EVERY DAY AT BEDTIME AS NEEDED 20 tablet 0    cetirizine (ZyrTEC) 10 mg tablet Take 10 mg by mouth daily as needed   Cholecalciferol (VITAMIN D3) 2000 units TABS Take 2,000 Units by mouth daily      cyanocobalamin (VITAMIN B-12) 1,000 mcg tablet Take by mouth daily      famotidine (PEPCID) 20 mg tablet Take 20 mg by mouth daily   FINACEA 15 % FOAM EVERY NIGHT AT BEDTIME TO FACIAL ROSACEA  0    JUNEL FE 1/20 1-20 MG-MCG per tablet TAKE 1 TABLET DAILY 84 tablet 2    Multiple Vitamins-Minerals (ONE-A-DAY WOMENS 50+ ADVANTAGE) TABS Take by mouth      oxybutynin (DITROPAN-XL) 10 MG 24 hr tablet Take 1 tablet (10 mg total) by mouth daily at bedtime 90 tablet 3    SOOLANTRA 1 % CREA APPLY EVERY MORNING TO FACE  0    Wheat Dextrin (BENEFIBER PO) Take by mouth      escitalopram (LEXAPRO) 10 mg tablet Take 1 tablet (10 mg total) by mouth daily 30 tablet 5     No current facility-administered medications for this visit  Objective:    /70   Pulse 71   Temp 98 9 °F (37 2 °C)   Resp 16   Ht 5' 4" (1 626 m)   Wt 89 4 kg (197 lb 3 2 oz)   LMP 08/09/2016   SpO2 98%   BMI 33 85 kg/m²      Physical Exam   Constitutional: She is oriented to person, place, and time  She appears well-developed and well-nourished  Eyes: Pupils are equal, round, and reactive to light  EOM are normal    Neck: Normal range of motion  Neck supple  Cardiovascular: Normal rate, regular rhythm, normal heart sounds and intact distal pulses  Pulmonary/Chest: Effort normal and breath sounds normal    Abdominal: Soft  Musculoskeletal: Normal range of motion  Neurological: She is alert and oriented to person, place, and time  Skin: There is erythema (band of reddness over chest)  Psychiatric: She has a normal mood and affect  Her behavior is normal  Judgment and thought content normal    Nursing note and vitals reviewed            Isa Parra DO

## 2020-07-29 DIAGNOSIS — N32.81 OAB (OVERACTIVE BLADDER): ICD-10-CM

## 2020-07-29 RX ORDER — OXYBUTYNIN CHLORIDE 10 MG/1
10 TABLET, EXTENDED RELEASE ORAL
Qty: 90 TABLET | Refills: 3 | Status: SHIPPED | OUTPATIENT
Start: 2020-07-29

## 2020-08-16 DIAGNOSIS — F41.1 GENERALIZED ANXIETY DISORDER: ICD-10-CM

## 2020-08-17 RX ORDER — ALPRAZOLAM 0.25 MG/1
TABLET ORAL
Qty: 20 TABLET | Refills: 0 | Status: SHIPPED | OUTPATIENT
Start: 2020-08-17 | End: 2020-10-21 | Stop reason: SDUPTHER

## 2020-08-17 NOTE — TELEPHONE ENCOUNTER
Medication:Alprazolam   PDMP   06/16/2020  1  06/16/2020  ALPRAZOLAM 0 25 MG TABLET  20 0  20  TI SHE       Active agreement on file -No

## 2020-10-20 ENCOUNTER — TRANSCRIBE ORDERS (OUTPATIENT)
Dept: ADMINISTRATIVE | Facility: HOSPITAL | Age: 50
End: 2020-10-20

## 2020-10-20 DIAGNOSIS — Z12.31 ENCOUNTER FOR SCREENING MAMMOGRAM FOR MALIGNANT NEOPLASM OF BREAST: Primary | ICD-10-CM

## 2020-10-21 DIAGNOSIS — F41.1 GENERALIZED ANXIETY DISORDER: ICD-10-CM

## 2020-10-21 RX ORDER — ALPRAZOLAM 0.25 MG/1
0.25 TABLET ORAL
Qty: 20 TABLET | Refills: 0 | Status: SHIPPED | OUTPATIENT
Start: 2020-10-21 | End: 2021-01-25

## 2021-01-24 DIAGNOSIS — F41.1 GENERALIZED ANXIETY DISORDER: ICD-10-CM

## 2021-01-25 RX ORDER — ALPRAZOLAM 0.25 MG/1
TABLET ORAL
Qty: 20 TABLET | Refills: 0 | Status: SHIPPED | OUTPATIENT
Start: 2021-01-25

## 2021-01-25 NOTE — TELEPHONE ENCOUNTER
Medication:Alprazolam  PDMP   10/29/2020  1  10/21/2020  ALPRAZOLAM 0 25 MG TABLET  20 0  20  TI SHE        Active agreement on file -No

## 2022-03-20 ENCOUNTER — HOSPITAL ENCOUNTER (OUTPATIENT)
Dept: MAMMOGRAPHY | Facility: HOSPITAL | Age: 52
Discharge: HOME | End: 2022-03-20
Payer: COMMERCIAL

## 2022-03-20 DIAGNOSIS — Z12.31 ENCOUNTER FOR SCREENING MAMMOGRAM FOR MALIGNANT NEOPLASM OF BREAST: ICD-10-CM

## 2022-03-20 PROCEDURE — 77067 SCR MAMMO BI INCL CAD: CPT

## 2022-03-20 PROCEDURE — 77063 BREAST TOMOSYNTHESIS BI: CPT

## 2023-02-28 ENCOUNTER — TELEPHONE (OUTPATIENT)
Dept: UROLOGY | Facility: CLINIC | Age: 53
End: 2023-02-28

## 2023-02-28 NOTE — TELEPHONE ENCOUNTER
PT BEING REFERRED BY PCP FOR [facility]  NEEDS A NEW PT APPT BECAUSE IT HAS BEEN MORE THAN 3 YEARS SINCE SHE WAS LAST SEEN  CALLED PT LM TO CALL BACK AND SCHEDULE  PCP OFFICE NOTE SCANNED INTO THE CHART

## 2023-03-02 NOTE — TELEPHONE ENCOUNTER
2nd attempt: PT BEING REFERRED BY PCP FOR [facility]  NEEDS A NEW PT APPT BECAUSE IT HAS BEEN MORE THAN 3 YEARS SINCE SHE WAS LAST SEEN  CALLED PT LM TO CALL BACK AND SCHEDULE  PCP OFFICE NOTE SCANNED INTO THE CHART

## 2023-03-06 NOTE — TELEPHONE ENCOUNTER
3rd attempt: PT BEING REFERRED BY PCP FOR [facility]  NEEDS A NEW PT APPT BECAUSE IT HAS BEEN MORE THAN 3 YEARS SINCE SHE WAS LAST SEEN  CALLED PT LM TO CALL BACK AND SCHEDULE   PCP OFFICE NOTE SCANNED INTO THE CHART

## 2023-04-12 ENCOUNTER — TELEPHONE (OUTPATIENT)
Dept: OBGYN CLINIC | Facility: CLINIC | Age: 53
End: 2023-04-12

## 2023-05-09 ENCOUNTER — APPOINTMENT (OUTPATIENT)
Dept: LAB | Facility: HOSPITAL | Age: 53
End: 2023-05-09

## 2023-05-09 ENCOUNTER — TELEPHONE (OUTPATIENT)
Dept: ENDOCRINOLOGY | Facility: HOSPITAL | Age: 53
End: 2023-05-09

## 2023-05-09 ENCOUNTER — OFFICE VISIT (OUTPATIENT)
Dept: ENDOCRINOLOGY | Facility: HOSPITAL | Age: 53
End: 2023-05-09

## 2023-05-09 VITALS
SYSTOLIC BLOOD PRESSURE: 138 MMHG | HEIGHT: 64 IN | WEIGHT: 212 LBS | DIASTOLIC BLOOD PRESSURE: 80 MMHG | BODY MASS INDEX: 36.19 KG/M2 | HEART RATE: 68 BPM

## 2023-05-09 DIAGNOSIS — R63.5 WEIGHT GAIN: Primary | ICD-10-CM

## 2023-05-09 DIAGNOSIS — R61 EXCESSIVE SWEATING: ICD-10-CM

## 2023-05-09 DIAGNOSIS — E66.9 CLASS 2 OBESITY WITHOUT SERIOUS COMORBIDITY WITH BODY MASS INDEX (BMI) OF 36.0 TO 36.9 IN ADULT, UNSPECIFIED OBESITY TYPE: ICD-10-CM

## 2023-05-09 DIAGNOSIS — R63.5 WEIGHT GAIN: ICD-10-CM

## 2023-05-09 PROBLEM — E66.812 CLASS 2 OBESITY WITHOUT SERIOUS COMORBIDITY WITH BODY MASS INDEX (BMI) OF 36.0 TO 36.9 IN ADULT: Status: ACTIVE | Noted: 2023-05-09

## 2023-05-09 RX ORDER — ESTRADIOL 1 MG/1
1.5 TABLET ORAL DAILY
COMMUNITY
Start: 2023-02-22

## 2023-05-09 RX ORDER — MIRABEGRON 25 MG/1
25 TABLET, FILM COATED, EXTENDED RELEASE ORAL DAILY
COMMUNITY
Start: 2023-04-01

## 2023-05-09 RX ORDER — PROGESTERONE 100 MG/1
1 CAPSULE ORAL DAILY
COMMUNITY
Start: 2023-02-23

## 2023-05-09 NOTE — PATIENT INSTRUCTIONS
For now, continue the same estrogen and progesterone and see GYN  We'll do blood work to look for endocrine issues  We'll do 24 hour urine test      Then we can consider treating the weight if  you want, likely contrave for the food cravings, you can also try Qsymia  Check with insurance to see if they are covered       Follow up to be determined

## 2023-05-09 NOTE — PROGRESS NOTES
Dali Boy 47 y o  female MRN: [id number]    Encounter: [id number]      Assessment/Plan    Post-menopausal/ weight gain  -Continue the same estrogen and progesterone and see GYN regarding this  -Get blood work to look for endocrine issues  - Do 24 hour urine test to look at cortisol level    -Check with insurance to see if they cover weight loss medications   -Follow up to be determined    CC: Weight loss and hormone replacement therapy consult    History of Present Illness     HPI:  Satnam Luna is a 47 y o F with a PMH of GERD, constipation, varicose veins, and acne presenting for a consult regarding hormone replacement therapy and weight gain  She reports her first menses at the age of 4  Her periods were very heavy and was started OCPs at the age of 14  She was on OCPs for many years until having her children  She reports going through menopause at the age of 38, the following year she had a bladder prolapse and had a sling procedure and was diagnosed with overactive bladder  She was started on hormone replacement therapy in 2019 however only started taking it consistently since January  She reports that her mother went through menopause late, in her 62s  She reports a family history of hypothyroidism and denies any other endocrine disorder in the family  She previously tried [facility] and Ozempic in the past for weight loss and did not see true results  She states that over the past 3 weeks her symptoms have gotten worse  She reports fatigue, a 20-30 pound weight gain over the past few months, eye dryness, occasional palpations, constipation, always being cold, having hot flashes, having excessive thirst, urinating frequently, having food cravings, vaginal dryness and itchiness, hair thinning on her head, having coarse hair on her chin, a rash on her back and back of her neck, having mood swings, feeling anxious and not sleeping well   She denies trouble swallowing, a change in her voice, diplopia, shortness of breath, chest pain, abdominal pain, diarrhea, nausea, vomiting, polyphagia, galactorrhea, difficulty urinating, dysuria, vaginal discharge, vaginal odor, change in shoe size, brittle nails, purple striae, tremors or headaches  She reports seeing an endocrinologist in her 25s however no workup was performed  Review of Systems   Constitutional: Positive for fatigue and unexpected weight change  Gained 20-30 pounds within the past few months   HENT: Negative for trouble swallowing and voice change  Eyes: Negative for visual disturbance  Eye dryness  Denies diplopia  Respiratory: Negative for shortness of breath  Cardiovascular: Positive for palpitations  Negative for chest pain  Occasional palpations   Gastrointestinal: Positive for constipation  Negative for abdominal pain, diarrhea, nausea and vomiting  Endocrine: Positive for cold intolerance, heat intolerance, polydipsia and polyuria  Negative for polyphagia  Food cravings  Denies galactorrhea  Genitourinary: Positive for frequency and urgency  Negative for difficulty urinating, dysuria, menstrual problem and vaginal discharge  Vaginal dryness and itchiness  Denies discharge or any odor  Musculoskeletal:        Denies change in shoe size   Skin:        Hair thinning  Breasts and axilla get very itchy  Dry skin  No brittle nails  Some coarse chin hair  Denies purple striae  Neurological: Negative for tremors and headaches  Psychiatric/Behavioral: Positive for sleep disturbance  The patient is nervous/anxious           Mood swings       Historical Information   Past Medical History:   Diagnosis Date   • Anemia    • Asthma     last assessed: 7/10/2014   • GERD (gastroesophageal reflux disease)    • Migraines    • Seasonal allergies     last assessed: 7/10/2014   • Urinary incontinence    • Varicose vein of leg      Past Surgical History:   Procedure Laterality Date   • CYSTOSCOPY N/A 11/13/2017    Procedure: CYSTOSCOPY;  Surgeon: Fred Coyne MD;  Location: [facility];  Service: Urology   • ESOPHAGOGASTRODUODENOSCOPY     • DE SLING OPERATION STRESS INCONTINENCE N/A 2017    Procedure: Mervat Console;  Surgeon: Fred Coyne MD;  Location: [facility];  Service: Urology   • DE STAB PHLEBT VARICOSE VEINS 1 XTR 10-20 STAB INCS Right 2016    Procedure: STAB PHLEBECTOMY OF LEG;  Surgeon: Gregor Krause MD;  Location: [facility];  Service: Vascular   • TONSILECTOMY AND ADNOIDECTOMY     • TONSILLECTOMY     • TUBAL LIGATION     • VARICOSE VEIN SURGERY Bilateral 1988    stripping and other procedures   • WISDOM TOOTH EXTRACTION       Social History   Social History     Substance and Sexual Activity   Alcohol Use Not Currently     Social History     Substance and Sexual Activity   Drug Use Never     Social History     Tobacco Use   Smoking Status Former   • Types: Cigarettes   • Quit date: 2004   • Years since quittin 3   Smokeless Tobacco Never   Tobacco Comments    never smoker (as per allscripts)     Family History:   Family History   Problem Relation Age of Onset   • Bipolar disorder Mother    • Anxiety disorder Mother         and depression   • Hypertension Mother    • Hypothyroidism Mother    • Depression Mother         and anxiety   • COPD Father    • Peripheral vascular disease Father    • Coronary artery disease Father    • Diabetes Father         mellitus   • Emphysema Father    • Anxiety disorder Father         and depression   • Depression Father         and anxiety   • Arthritis Father    • Other Father         cardiac disorder   • Hypertension Father            • Cancer Father    • Cancer Other    • Breast cancer Maternal Aunt 72   • No Known Problems Daughter    • No Known Problems Daughter        Meds/Allergies   Current Outpatient Medications   Medication Sig Dispense Refill   • ALPRAZolam (XANAX) 0 25 mg tablet TAKE 1 TABLET BY MOUTH DAILY AT BEDTIME AS NEEDED "FOR ANXIETY 20 tablet 0   • cetirizine (ZyrTEC) 10 mg tablet Take 10 mg by mouth daily as needed  • cyanocobalamin (VITAMIN B-12) 1,000 mcg tablet Take by mouth daily     • estradiol (ESTRACE) 1 mg tablet Take 1 mg by mouth daily     • famotidine (PEPCID) 20 mg tablet Take 20 mg by mouth daily  • Multiple Vitamins-Minerals (ONE-A-DAY WOMENS 50+ ADVANTAGE) TABS Take by mouth     • Myrbetriq 25 MG TB24 Take 25 mg by mouth daily     • Progesterone 100 MG CAPS Take 1 capsule by mouth daily     • Wheat Dextrin (BENEFIBER PO) Take by mouth     • Cholecalciferol (VITAMIN D3) 2000 units TABS Take 2,000 Units by mouth daily (Patient not taking: Reported on 5/9/2023)     • escitalopram (LEXAPRO) 10 mg tablet Take 1 tablet (10 mg total) by mouth daily (Patient not taking: Reported on 5/9/2023) 30 tablet 5   • FINACEA 15 % FOAM EVERY NIGHT AT BEDTIME TO FACIAL ROSACEA (Patient not taking: Reported on 5/9/2023)  0   • JUNEL FE 1/20 1-20 MG-MCG per tablet TAKE 1 TABLET DAILY (Patient not taking: Reported on 5/9/2023) 84 tablet 2   • oxybutynin (DITROPAN-XL) 10 MG 24 hr tablet TAKE 1 TABLET (10 MG TOTAL) BY MOUTH DAILY AT BEDTIME (Patient not taking: Reported on 5/9/2023) 90 tablet 3   • SOOLANTRA 1 % CREA APPLY EVERY MORNING TO FACE (Patient not taking: Reported on 5/9/2023)  0     No current facility-administered medications for this visit       Allergies   Allergen Reactions   • Penicillins Hives, Swelling and Throat Swelling     Throat swelling   • Sulfa Antibiotics Swelling and Rash     Throat swelling   • Acetaminophen Dizziness     \"feels overmedicated\"   • Mold Extract [Trichophyton] Itching   • Morphine And Related      Severe migraineand nausea   • Mushroom Extract Complex - Food Allergy    • Codeine Rash, GI Intolerance and Headache   • Latex Rash     Added based on information entered during case entry, please review and add reactions, type, and severity as needed       Objective   Vitals: Blood pressure " "138/80, pulse 68, height 5' 4\" (1 626 m), weight 96 2 kg (212 lb), last menstrual period 08/09/2016  Physical Exam  Constitutional:       Appearance: Normal appearance  HENT:      Head: Normocephalic and atraumatic  Eyes:      Extraocular Movements: Extraocular movements intact  Comments: No lig lad, periorbital edema or exophthalmos    Neck:      Thyroid: No thyroid mass, thyromegaly or thyroid tenderness  Vascular: No carotid bruit  Cardiovascular:      Rate and Rhythm: Normal rate and regular rhythm  Pulses: Normal pulses  Heart sounds: Normal heart sounds  Pulmonary:      Effort: Pulmonary effort is normal       Breath sounds: Normal breath sounds  Abdominal:      Palpations: Abdomen is soft  Comments: No purple striae noted   Musculoskeletal:         General: No tenderness  Cervical back: Neck supple  No tenderness  Right lower leg: Edema present  Left lower leg: Edema present  Comments: No tremor of the outstretched hands   Skin:     General: Skin is warm and dry  Findings: Rash present  Comments: Red rash on upper back and neck   Neurological:      General: No focal deficit present  Mental Status: She is alert and oriented to person, place, and time  Deep Tendon Reflexes: Reflexes normal       Comments: Normal patellar reflexes   Psychiatric:         Mood and Affect: Mood normal          Behavior: Behavior normal        The history was obtained from the review of the chart and the patient        "

## 2023-05-17 ENCOUNTER — TELEPHONE (OUTPATIENT)
Dept: OBGYN CLINIC | Facility: CLINIC | Age: 53
End: 2023-05-17

## 2023-05-17 NOTE — TELEPHONE ENCOUNTER
Patient said she is having hot flashes, moodiness, food cravings and has gained weight. She is currently on HRT since 2020. She was put on medication by her family doctor. Patients doctor told her to reach out to an OBGYN. Patient scheduled an appointment in June to talk to one of our providers.

## 2023-05-19 ENCOUNTER — OFFICE VISIT (OUTPATIENT)
Dept: UROLOGY | Facility: CLINIC | Age: 53
End: 2023-05-19

## 2023-05-19 VITALS
HEART RATE: 83 BPM | WEIGHT: 212 LBS | RESPIRATION RATE: 18 BRPM | HEIGHT: 64 IN | DIASTOLIC BLOOD PRESSURE: 82 MMHG | OXYGEN SATURATION: 98 % | BODY MASS INDEX: 36.19 KG/M2 | SYSTOLIC BLOOD PRESSURE: 132 MMHG

## 2023-05-19 DIAGNOSIS — N39.41 URGE INCONTINENCE: Primary | ICD-10-CM

## 2023-05-19 LAB
POST-VOID RESIDUAL VOLUME, ML POC: 55 ML
SL AMB  POCT GLUCOSE, UA: NORMAL
SL AMB LEUKOCYTE ESTERASE,UA: NORMAL
SL AMB POCT BILIRUBIN,UA: NORMAL
SL AMB POCT BLOOD,UA: NORMAL
SL AMB POCT CLARITY,UA: CLEAR
SL AMB POCT COLOR,UA: YELLOW
SL AMB POCT KETONES,UA: NORMAL
SL AMB POCT NITRITE,UA: NORMAL
SL AMB POCT PH,UA: 5
SL AMB POCT SPECIFIC GRAVITY,UA: 1
SL AMB POCT URINE PROTEIN: NORMAL
SL AMB POCT UROBILINOGEN: 0.2

## 2023-05-19 RX ORDER — TROSPIUM CHLORIDE 20 MG/1
20 TABLET, FILM COATED ORAL 2 TIMES DAILY
Qty: 60 TABLET | Refills: 6 | Status: SHIPPED | OUTPATIENT
Start: 2023-05-19

## 2023-05-19 NOTE — PROGRESS NOTES
UROLOGY CONSULTATION NOTE     Patient Identifiers: Codie Giron (MRN: [id number])  Service Requesting Consultation: Jackie Crouch MD  Service Providing Consultation:  Urology, Kimberly Lui PA-C  Consults  Date of Service: 5/19/2023    Reason for Consultation: Urgent continence    History of Present Illness:     Codie Giron is a 47 y o  female known to me from Dr Mariaelena Villatoro office  She had a transvaginal obturator sling in 2017  She complained of urinary frequency with urge incontinence and was sent for urodynamic testing after which she saw Dr Lilliana Kitchen  She was maintained on Vesicare which helped her urinary symptoms but needed to be stopped due to side effects including ocular dryness and blurred vision  She does not get urinary tract infections  She is a non-smoker  She is not diabetic and had 2 children      Past Medical, Past Surgical History:     Past Medical History:   Diagnosis Date   • Anemia    • Asthma     last assessed: 7/10/2014   • GERD (gastroesophageal reflux disease)    • Migraines    • Seasonal allergies     last assessed: 7/10/2014   • Sleep apnea    • Urinary incontinence    • Varicose vein of leg    :    Past Surgical History:   Procedure Laterality Date   • CYSTOSCOPY N/A 11/13/2017    Procedure: Linda Jones;  Surgeon: Rosana Dias MD;  Location: [facility];  Service: Urology   • ESOPHAGOGASTRODUODENOSCOPY     • WI SLING OPERATION STRESS INCONTINENCE N/A 11/13/2017    Procedure: Raj Griffith;  Surgeon: Rosana Dias MD;  Location: [facility];  Service: Urology   • WI STAB PHLEBT VARICOSE VEINS 1 XTR 10-20 STAB INCS Right 06/17/2016    Procedure: STAB PHLEBECTOMY OF LEG;  Surgeon: Dennis Jeter MD;  Location: [facility];  Service: Vascular   • TONSILLECTOMY     • TUBAL LIGATION Bilateral    • VARICOSE VEIN SURGERY Bilateral 1988    stripping and other procedures   • WISDOM TOOTH EXTRACTION     :    Medications, Allergies:     Current "Outpatient Medications:   •  ALPRAZolam (XANAX) 0 25 mg tablet, TAKE 1 TABLET BY MOUTH DAILY AT BEDTIME AS NEEDED FOR ANXIETY, Disp: 20 tablet, Rfl: 0  •  cetirizine (ZyrTEC) 10 mg tablet, Take 10 mg by mouth daily as needed  , Disp: , Rfl:   •  cyanocobalamin (VITAMIN B-12) 1,000 mcg tablet, Take by mouth daily, Disp: , Rfl:   •  estradiol (ESTRACE) 1 mg tablet, Take 1 5 mg by mouth daily, Disp: , Rfl:   •  famotidine (PEPCID) 20 mg tablet, Take 20 mg by mouth daily  , Disp: , Rfl:   •  Multiple Vitamins-Minerals (ONE-A-DAY WOMENS 50+ ADVANTAGE) TABS, Take by mouth, Disp: , Rfl:   •  Myrbetriq 25 MG TB24, Take 25 mg by mouth daily, Disp: , Rfl:   •  Progesterone 100 MG CAPS, Take 1 capsule by mouth daily, Disp: , Rfl:   •  trospium chloride (SANCTURA) 20 mg tablet, Take 1 tablet (20 mg total) by mouth 2 (two) times a day, Disp: 60 tablet, Rfl: 6  •  Wheat Dextrin (BENEFIBER PO), Take by mouth, Disp: , Rfl:   •  Cholecalciferol (VITAMIN D3) 2000 units TABS, Take 2,000 Units by mouth daily (Patient not taking: Reported on 2023), Disp: , Rfl:     Allergies:   Allergies   Allergen Reactions   • Penicillins Hives, Swelling and Throat Swelling     Throat swelling   • Sulfa Antibiotics Swelling and Rash     Throat swelling   • Acetaminophen Dizziness     \"feels overmedicated\"   • Mold Extract [Trichophyton] Itching   • Morphine And Related      Severe migraineand nausea   • Mushroom Extract Complex - Food Allergy    • Codeine Rash, GI Intolerance and Headache   • Latex Rash     Added based on information entered during case entry, please review and add reactions, type, and severity as needed   :    Social and Family History:   Social History:   Social History     Tobacco Use   • Smoking status: Former     Types: Cigarettes     Quit date: 2004     Years since quittin 3   • Smokeless tobacco: Never   • Tobacco comments:     never smoker (as per allscripts)   Vaping Use   • Vaping Use: Never used   Substance Use " "Topics   • Alcohol use: Not Currently   • Drug use: Never     Social History     Tobacco Use   Smoking Status Former   • Types: Cigarettes   • Quit date: 2004   • Years since quittin 3   Smokeless Tobacco Never   Tobacco Comments    never smoker (as per allscripts)       Family History:  Family History   Problem Relation Age of Onset   • Bipolar disorder Mother    • Anxiety disorder Mother         and depression   • Hypertension Mother    • Hypothyroidism Mother    • Depression Mother         and anxiety   • COPD Father    • Peripheral vascular disease Father    • Coronary artery disease Father    • Diabetes Father         mellitus   • Emphysema Father    • Anxiety disorder Father         and depression   • Depression Father         and anxiety   • Arthritis Father    • Other Father         cardiac disorder   • Hypertension Father            • Cancer Father    • Hypertension Brother    • Hypertension Brother    • Eczema Brother    • Breast cancer Maternal Aunt 72   • Dementia Maternal Aunt    • No Known Problems Daughter    • No Known Problems Daughter    • Cancer Other    :     Review of Systems:     General: Fever, chills, or night sweats: negative  Cardiac: Negative for chest pain  Pulmonary: Negative for shortness of breath  Gastrointestinal: Abdominal pain negative  Nausea, vomiting, or diarrhea negative,  Genitourinary: See HPI above  Patient does not have hematuria  All other systems queried were negative  Physical Exam:   General: Patient is pleasant and in NAD   Awake and alert  /82 (BP Location: Right arm, Patient Position: Sitting, Cuff Size: Standard)   Pulse 83   Resp 18   Ht 5' 4\" (1 626 m)   Wt 96 2 kg (212 lb)   LMP 2016   SpO2 98%   BMI 36 39 kg/m²   HEENT:  Conjunctiva are clear  Constitutional:  pleasant and cooperative     no apparent distress  Cardiac: Peripheral edema: negative  Pulmonary: Non-labored breathing  Abdomen: Soft, non-tender, " non-distended  No surgical scars  No masses, tenderness, hernias noted  Genitourinary: Negative CVA tenderness, negative suprapubic tenderness  Extremities:  Moves all extremities  Neurological:CNII-XII intact  No numbness or tingling  Essentially non focal neurologic exam  Psychiatric:mood affect and behavior normal        Labs:     Lab Results   Component Value Date    HGB 13 9 04/29/2019    HCT 45 0 04/29/2019    WBC 3 63 (L) 04/29/2019     04/29/2019   ]    Lab Results   Component Value Date     05/02/2015    K 4 0 10/01/2019     10/01/2019    CO2 28 10/01/2019    BUN 14 10/01/2019    CREATININE 0 90 10/01/2019    CALCIUM 8 5 10/01/2019    GLUCOSE 92 05/02/2015   ]    Imaging:   I personally reviewed the images and report of the following studies, and reviewed them with the patient:    None      ASSESSMENT:     #1  Urge urinary incontinence    PLAN:   -She previously had a discussion with Arielle Hughes regarding Level 3 treatments for her urge incontinence  -I again reviewed Botox, InterStim and tibial nerve stimulation  -She is leaning towards Botox  -First we will try her on Sanctura  -Follow-up in 3 months  -She can call me sooner if the medication is ineffective and she would like to proceed with Botox sooner      Thank you for allowing me to participate in this patients’ care  Please do not hesitate to call with any additional questions    Tamiko Lui PA-C

## 2023-06-06 ENCOUNTER — OFFICE VISIT (OUTPATIENT)
Dept: OBGYN CLINIC | Facility: CLINIC | Age: 53
End: 2023-06-06
Payer: COMMERCIAL

## 2023-06-06 VITALS
DIASTOLIC BLOOD PRESSURE: 88 MMHG | HEIGHT: 64 IN | WEIGHT: 214 LBS | BODY MASS INDEX: 36.54 KG/M2 | SYSTOLIC BLOOD PRESSURE: 140 MMHG

## 2023-06-06 DIAGNOSIS — E28.319 EARLY ONSET MENOPAUSE: ICD-10-CM

## 2023-06-06 DIAGNOSIS — L72.3 SEBACEOUS CYST: Primary | ICD-10-CM

## 2023-06-06 PROCEDURE — 99204 OFFICE O/P NEW MOD 45 MIN: CPT | Performed by: PHYSICIAN ASSISTANT

## 2023-06-06 NOTE — PROGRESS NOTES
Assessment/Plan:    No problem-specific Assessment & Plan notes found for this encounter  Problem List Items Addressed This Visit    None  Visit Diagnoses     Sebaceous cyst    -  Primary    Early onset menopause              Advised lump in vulva is benign cyst, no need for tx or removal as it is asx for pt  Advised addition of coconut oil to help with vaginal moisture  Reviewed Cerave to help with overall body moisture  We reviewed multiple hormonal complaints with normal labs recently as well as POF  I d/w pt Dr Farrah Funk and his specialization in anti-aging anf hormonal wellness  She is most interested in seeing him  Information given to pt to call and establish  Reassured breat exam appears normal and itching likely due to overall dryness  Pt has ammo scheduled this year  Subjective:      Patient ID: Wagner Yarbrough is a 47 y o  female  Pt to discuss hormonal complaints  LMP age 43, POF  She was taking OCPs then stopped them in her 45s  Pt has been on HRT on Estrace 1 5 mg daily as well as progesterone 100 mg daily  PCP began her on HRT approx 4 years ago  She took it for 3 years off an on and has been taking it more consistently x last year  She has had an increase in weight gain over the last year, lack of energy, dryness throughout as well as itching on breast and inside of vulvar area  She does note h/o urinary leakage as well  She also notes lump on labia that is a pea size that appears new  She also notes a odor, although unsure where it is coming from  She sees urology for urinary leakage  She notes sleep changes and lack of libido, although she is not sexually active in a relationship  Denies any VB           The following portions of the patient's history were reviewed and updated as appropriate:   She  has a past medical history of Anemia, Asthma, Deep vein thrombosis ([address] ), Depression (2/2020), GERD (gastroesophageal reflux disease), Migraines, Seasonal allergies, Sleep apnea, Urinary incontinence, and Varicose vein of leg  She   Patient Active Problem List    Diagnosis Date Noted   • Weight gain 05/09/2023   • Class 2 obesity without serious comorbidity with body mass index (BMI) of 36 0 to 36 9 in adult 05/09/2023   • Excessive sweating 05/09/2023   • Allergic reaction 07/14/2020   • History of pubovaginal sling 06/05/2019   • Chronic idiopathic constipation 06/05/2019   • Women's annual routine gynecological examination 05/30/2019   • Screening for cardiovascular condition 04/08/2019   • Cold extremity without peripheral vascular disease 03/05/2018   • Chronic fatigue 03/05/2018   • Pain of both wrist joints 03/05/2018   • Chronic arthralgias of knees and hips 03/05/2018   • Varicose veins of left lower extremity with pain 08/24/2016   • Varicose vein of leg    • Anxiety 02/24/2015   • Hyperparathyroidism ([address] ) 07/28/2014   • Iron deficiency anemia 07/28/2014   • Vitamin D deficiency 07/28/2014   • Acne 07/10/2014   • Allergic rhinitis due to pollen 07/10/2014   • GERD (gastroesophageal reflux disease) 07/10/2014   • Urge and stress incontinence 07/10/2014     She  has a past surgical history that includes Tubal ligation (Bilateral); Varicose vein surgery (Bilateral, 1988); Tonsillectomy; Esophagogastroduodenoscopy; Henefer tooth extraction; pr stab phlebt varicose veins 1 xtr 10-20 stab incs (Right, 06/17/2016); pr sling operation stress incontinence (N/A, 11/13/2017); and CYSTOSCOPY (N/A, 11/13/2017)    Her family history includes Anxiety disorder in her father and mother; Arthritis in her father; Bipolar disorder in her mother; Breast cancer (age of onset: 72) in her maternal aunt; COPD in her father; Cancer in her father, maternal aunt, and other; Coronary artery disease in her father; Deep vein thrombosis in her maternal aunt; Dementia in her maternal aunt; Depression in her father and mother; Diabetes in her father; Eczema in her brother; Emphysema in her father; Heart disease in her father; Hypertension in her brother, brother, father, and mother; Hypothyroidism in her mother; Lung disease in her father; Migraines in her mother; No Known Problems in her daughter and daughter; Other in her father; Peripheral vascular disease in her father; Uterine cancer in her maternal aunt  She  reports that she quit smoking about 19 years ago  Her smoking use included cigarettes  She has never used smokeless tobacco  She reports that she does not currently use alcohol  She reports that she does not use drugs  Current Outpatient Medications   Medication Sig Dispense Refill   • ALPRAZolam (XANAX) 0 25 mg tablet TAKE 1 TABLET BY MOUTH DAILY AT BEDTIME AS NEEDED FOR ANXIETY 20 tablet 0   • cetirizine (ZyrTEC) 10 mg tablet Take 10 mg by mouth daily as needed  • Cholecalciferol (VITAMIN D3) 2000 units TABS Take 2,000 Units by mouth daily     • cyanocobalamin (VITAMIN B-12) 1,000 mcg tablet Take by mouth daily     • estradiol (ESTRACE) 1 mg tablet Take 1 5 mg by mouth daily     • famotidine (PEPCID) 20 mg tablet Take 20 mg by mouth daily  • Multiple Vitamins-Minerals (ONE-A-DAY WOMENS 50+ ADVANTAGE) TABS Take by mouth     • Progesterone 100 MG CAPS Take 1 capsule by mouth daily     • trospium chloride (SANCTURA) 20 mg tablet Take 1 tablet (20 mg total) by mouth 2 (two) times a day 60 tablet 6   • Wheat Dextrin (BENEFIBER PO) Take by mouth     • Myrbetriq 25 MG TB24 Take 25 mg by mouth daily       No current facility-administered medications for this visit  Current Outpatient Medications on File Prior to Visit   Medication Sig   • ALPRAZolam (XANAX) 0 25 mg tablet TAKE 1 TABLET BY MOUTH DAILY AT BEDTIME AS NEEDED FOR ANXIETY   • cetirizine (ZyrTEC) 10 mg tablet Take 10 mg by mouth daily as needed       • Cholecalciferol (VITAMIN D3) 2000 units TABS Take 2,000 Units by mouth daily   • cyanocobalamin (VITAMIN B-12) 1,000 mcg tablet Take by mouth daily   • estradiol (ESTRACE) 1 mg "tablet Take 1 5 mg by mouth daily   • famotidine (PEPCID) 20 mg tablet Take 20 mg by mouth daily  • Multiple Vitamins-Minerals (ONE-A-DAY WOMENS 50+ ADVANTAGE) TABS Take by mouth   • Progesterone 100 MG CAPS Take 1 capsule by mouth daily   • trospium chloride (SANCTURA) 20 mg tablet Take 1 tablet (20 mg total) by mouth 2 (two) times a day   • Wheat Dextrin (BENEFIBER PO) Take by mouth   • Myrbetriq 25 MG TB24 Take 25 mg by mouth daily     No current facility-administered medications on file prior to visit  She is allergic to penicillins, sulfa antibiotics, acetaminophen, mold extract [trichophyton], morphine and related, mushroom extract complex - food allergy, codeine, and latex       Review of Systems   Constitutional: Positive for activity change, fatigue and unexpected weight change  Cardiovascular: Negative  Gastrointestinal: Negative  Genitourinary: Negative for dyspareunia, dysuria, menstrual problem and pelvic pain  Musculoskeletal: Negative  Objective:      /88 (BP Location: Left arm, Patient Position: Sitting, Cuff Size: Large)   Ht 5' 4\" (1 626 m)   Wt 97 1 kg (214 lb)   LMP 08/09/2016   BMI 36 73 kg/m²          Physical Exam  Vitals reviewed  Constitutional:       Appearance: She is obese  Cardiovascular:      Rate and Rhythm: Normal rate and regular rhythm  Pulmonary:      Effort: Pulmonary effort is normal       Breath sounds: Normal breath sounds  Chest:   Breasts:     Right: No swelling, bleeding, inverted nipple, mass, nipple discharge, skin change or tenderness  Left: No swelling, bleeding, inverted nipple, mass, nipple discharge, skin change or tenderness  Abdominal:      General: There is no distension  Palpations: There is no mass  Tenderness: There is no abdominal tenderness  There is no right CVA tenderness, left CVA tenderness, guarding or rebound  Hernia: No hernia is present     Genitourinary:     Labia:         Right: Lesion " present  No rash, tenderness or injury  Left: No rash, tenderness, lesion or injury  Urethra: No prolapse, urethral pain, urethral swelling or urethral lesion  Vagina: Normal       Cervix: Normal       Uterus: Normal        Adnexa: Right adnexa normal and left adnexa normal       Comments: With small hard nodule noted subcutaneously on edge of right labia majora c/w sebaceous cyst  Minimal vaginal atrophy noted on exam    Lymphadenopathy:      Upper Body:      Right upper body: No supraclavicular, axillary or pectoral adenopathy  Left upper body: No supraclavicular, axillary or pectoral adenopathy  Neurological:      Mental Status: She is alert  Psychiatric:         Mood and Affect: Mood normal          Behavior: Behavior normal          Thought Content:  Thought content normal          Judgment: Judgment normal

## 2023-07-21 ENCOUNTER — HOSPITAL ENCOUNTER (OUTPATIENT)
Dept: MAMMOGRAPHY | Facility: HOSPITAL | Age: 53
Discharge: HOME | End: 2023-07-21
Payer: COMMERCIAL

## 2023-07-21 VITALS — HEIGHT: 64 IN | BODY MASS INDEX: 36.54 KG/M2 | WEIGHT: 214 LBS

## 2023-07-21 DIAGNOSIS — Z12.31 ENCOUNTER FOR SCREENING MAMMOGRAM FOR MALIGNANT NEOPLASM OF BREAST: ICD-10-CM

## 2023-07-21 PROCEDURE — 77063 BREAST TOMOSYNTHESIS BI: CPT

## 2023-07-21 PROCEDURE — 77067 SCR MAMMO BI INCL CAD: CPT

## 2023-08-16 ENCOUNTER — TELEPHONE (OUTPATIENT)
Dept: OBGYN CLINIC | Facility: CLINIC | Age: 53
End: 2023-08-16

## 2023-08-16 NOTE — TELEPHONE ENCOUNTER
Patient called, left message on answering machine, stating her OCP was to be called into the [facility] in [facility] however she went there today to pick it up and nothing was sent in. Reviewed OV note 6/6/23 - LMP age 43, POF. She was taking OCPs then stopped them in her 45s. Will route to [place] to review.

## 2024-02-21 PROBLEM — Z01.419 WOMEN'S ANNUAL ROUTINE GYNECOLOGICAL EXAMINATION: Status: RESOLVED | Noted: 2019-05-30 | Resolved: 2024-02-21

## 2024-02-21 PROBLEM — Z13.6 SCREENING FOR CARDIOVASCULAR CONDITION: Status: RESOLVED | Noted: 2019-04-08 | Resolved: 2024-02-21

## 2024-03-29 ENCOUNTER — TELEPHONE (OUTPATIENT)
Dept: OBGYN CLINIC | Facility: CLINIC | Age: 54
End: 2024-03-29

## 2024-03-29 NOTE — TELEPHONE ENCOUNTER
Spoke with pt, offered sooner appt 4/1 and pt declined as she only wants to see Dr. Hicks and would like to keep her 5/9 appt. Pt asked recommendations for OTC creams to help the occ itchiness she is experiencing. I told her she can try organic coconut oil or OTC hydrocortisone cream per provider. She states she did try the coconut oil and that did not work, she agreed to try the cream. I told her if the cream doesn't help to give us a call back and pt understood.

## 2024-05-09 ENCOUNTER — OFFICE VISIT (OUTPATIENT)
Dept: OBGYN CLINIC | Facility: CLINIC | Age: 54
End: 2024-05-09
Payer: COMMERCIAL

## 2024-05-09 VITALS
WEIGHT: 210 LBS | SYSTOLIC BLOOD PRESSURE: 140 MMHG | DIASTOLIC BLOOD PRESSURE: 86 MMHG | HEIGHT: 64 IN | BODY MASS INDEX: 35.85 KG/M2

## 2024-05-09 DIAGNOSIS — N95.2 VAGINAL ATROPHY: ICD-10-CM

## 2024-05-09 DIAGNOSIS — N90.89 VULVAR LESION: Primary | ICD-10-CM

## 2024-05-09 PROCEDURE — 99214 OFFICE O/P EST MOD 30 MIN: CPT | Performed by: STUDENT IN AN ORGANIZED HEALTH CARE EDUCATION/TRAINING PROGRAM

## 2024-05-09 RX ORDER — BUPROPION HYDROCHLORIDE 150 MG/1
TABLET ORAL
COMMUNITY
Start: 2024-02-28

## 2024-05-09 RX ORDER — CEFUROXIME AXETIL 500 MG/1
500 TABLET ORAL EVERY 12 HOURS
COMMUNITY
Start: 2024-03-30

## 2024-05-09 RX ORDER — NITROFURANTOIN 25; 75 MG/1; MG/1
100 CAPSULE ORAL 2 TIMES DAILY
COMMUNITY
Start: 2024-03-22

## 2024-05-09 RX ORDER — AZITHROMYCIN 500 MG/1
500 TABLET, FILM COATED ORAL DAILY
COMMUNITY
Start: 2024-03-25

## 2024-05-09 RX ORDER — ESTRADIOL 0.1 MG/G
0.5 CREAM VAGINAL 2 TIMES WEEKLY
Qty: 42.5 G | Refills: 0 | Status: SHIPPED | OUTPATIENT
Start: 2024-05-09

## 2024-05-09 NOTE — PROGRESS NOTES
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  Patient also with vulvar inclusion cysts which we reviewed today and will monitor.  Plans on starting veozah and discussed risks and benefits of vaginal estrace cream in the setting of vaginal atrophy and dryness- patient would like to trial and Rx sent to pharmacy.  RTO for annual exam in June Diagnoses and all orders for this visit:    Vaginal atrophy  -     estradiol (ESTRACE VAGINAL) 0.1 mg/g vaginal cream; Insert 0.5 g into the vagina 2 (two) times a week    Subjective:      Patient ID: Kasandra Burns is a 53 y.o. female.    HPI  Kasandra is a 52 yo postmenopausal sexually active female presenting with complaints of vulvar/ clitoral bumps. Has noticed small bumps superior to clitoris since February- they don't itch but are starting to become uncomfortable and start to put pressure on clitoris. She does have a cyst on the right labia that began in August- does not cause pain, not draining.  She denies any abnormal discharge. She does note a stronger vaginal odor.She does have mild vaginal pruritus that she has experienced for years that she attributes to dryness.  Kasandra was previously seeing Dr. Vale for menopausal sx and initiated HRT which did not help and has since discontinued. She is working with her PCP to trial veozah.        The following portions of the patient's history were reviewed and updated as appropriate: allergies, current medications, past family history, past medical history, past social history, past surgical history, and problem list.    Review of Systems   Constitutional: Negative.  Negative for chills, fatigue, fever and  "unexpected weight change.   HENT: Negative.  Negative for dental problem, sinus pressure and sinus pain.    Eyes: Negative.  Negative for visual disturbance.   Respiratory: Negative.  Negative for cough, shortness of breath and wheezing.    Cardiovascular: Negative.  Negative for chest pain and leg swelling.   Gastrointestinal:  Positive for anal bleeding. Negative for diarrhea, nausea and vomiting.        Bleeding with bowel movements   Genitourinary:  Positive for urgency. Negative for menstrual problem and pelvic pain.        Vaginal cyst   Vaginal itching  Overactive bladder  Urinary leakage   Musculoskeletal:  Negative for back pain.   Allergic/Immunologic: Positive for environmental allergies.   Neurological:  Positive for headaches. Negative for dizziness.         Objective:  /86 (BP Location: Left arm, Patient Position: Sitting, Cuff Size: Large)   Ht 5' 4\" (1.626 m)   Wt 95.3 kg (210 lb)   LMP 08/09/2016   BMI 36.05 kg/m²      Physical Exam  Vitals reviewed.   Constitutional:       Appearance: Normal appearance.   HENT:      Head: Normocephalic and atraumatic.   Cardiovascular:      Rate and Rhythm: Normal rate.   Pulmonary:      Effort: Pulmonary effort is normal. No respiratory distress.   Abdominal:      Palpations: Abdomen is soft.      Tenderness: There is no abdominal tenderness. There is no guarding or rebound.   Genitourinary:         Comments: Small subtle varicosities noted in the clitoral region   Musculoskeletal:      Right lower leg: No edema.      Left lower leg: No edema.   Skin:     General: Skin is warm and dry.   Neurological:      Mental Status: She is alert and oriented to person, place, and time.   Psychiatric:         Behavior: Behavior normal.           "

## 2024-06-26 ENCOUNTER — ANNUAL EXAM (OUTPATIENT)
Dept: OBGYN CLINIC | Facility: CLINIC | Age: 54
End: 2024-06-26
Payer: COMMERCIAL

## 2024-06-26 VITALS
BODY MASS INDEX: 35.85 KG/M2 | DIASTOLIC BLOOD PRESSURE: 84 MMHG | HEIGHT: 64 IN | WEIGHT: 210 LBS | SYSTOLIC BLOOD PRESSURE: 142 MMHG

## 2024-06-26 DIAGNOSIS — Z12.31 ENCOUNTER FOR SCREENING MAMMOGRAM FOR MALIGNANT NEOPLASM OF BREAST: ICD-10-CM

## 2024-06-26 DIAGNOSIS — R92.30 DENSE BREASTS: ICD-10-CM

## 2024-06-26 DIAGNOSIS — E28.319 EARLY MENOPAUSE: ICD-10-CM

## 2024-06-26 DIAGNOSIS — Z01.419 WELL WOMAN EXAM WITH ROUTINE GYNECOLOGICAL EXAM: Primary | ICD-10-CM

## 2024-06-26 DIAGNOSIS — Z12.11 COLON CANCER SCREENING: ICD-10-CM

## 2024-06-26 PROCEDURE — G0145 SCR C/V CYTO,THINLAYER,RESCR: HCPCS | Performed by: STUDENT IN AN ORGANIZED HEALTH CARE EDUCATION/TRAINING PROGRAM

## 2024-06-26 PROCEDURE — S0612 ANNUAL GYNECOLOGICAL EXAMINA: HCPCS | Performed by: STUDENT IN AN ORGANIZED HEALTH CARE EDUCATION/TRAINING PROGRAM

## 2024-06-26 PROCEDURE — G0476 HPV COMBO ASSAY CA SCREEN: HCPCS | Performed by: STUDENT IN AN ORGANIZED HEALTH CARE EDUCATION/TRAINING PROGRAM

## 2024-06-26 RX ORDER — DOXYCYCLINE 100 MG/1
CAPSULE ORAL
COMMUNITY
Start: 2024-06-20

## 2024-06-26 RX ORDER — FEZOLINETANT 45 MG/1
45 TABLET, FILM COATED ORAL DAILY
COMMUNITY
Start: 2024-05-19

## 2024-06-26 NOTE — PROGRESS NOTES
[facility]  Well Woman Annual Exam  Kina Hicks MD  06/26/24      Assessment   53 y.o. postmenopausal female who is sexually active presenting for annual exam.   No concerns identified on today's exam.    Plan     Cervical cancer screening: Last Pap: 2019=- NILM, HPV neg. Pap with cotesting was completed today. The current ASCCP guidelines were reviewed.  The low risk patient will receive pap smear screening every 3 years or pap with HPV co-testing every 5 years. With 10 years of normal pap testing pap smears may be discontinued at age 65.  STD screening: The patient declined STD testing given she is not sexually active.    Breast cancer screening: Last Mammogram: 7/2023- BIRADS 2, mammogram ordered today.  ABUS was additionally ordered today given history of dense breasts on mammography. Reviewed ACOG recommendations of yearly mammogram for breast cancer screening   Colon cancer screening: Patient has not yet had baseline colonoscopy-ordered GI referral today.  History of menopause with age less than 45- FMR-1 gene testing ordered given history of early menopause.  I emphasized the importance of an annual pelvic and breast exam.   I have discussed the importance of monthly self-breast exams, exercise and healthy diet as well as adequate intake of calcium and vitamin D.     All questions have been answered to her satisfaction.    __________________________________________________________________      Subjective     53 y.o. postmenopausal female presenting for annual exam.    GYN  Complaints: denies  Denies dyspareunia, genital discharge, genital ulcers, pelvic pain, and vulvar/vaginal symptoms  Menopause in 40s- no bleeding since  Menopausal symptoms: decreased libido, hot flashes, vaginal dryness  Vaginal dryness and urinary symptoms have been improved since starting Estrace cream-will plan to increase to 3 times weekly given some dryness that remains.  Sexually active: No- has been for a while  Hx  STI: denies   Hx Abnormal pap: denies  Last pap: 2019- NILM, HPV neg    OB      x2      Complaints: denies  Denies urinary frequency, hematuria, urinary hesitancy, urinary retention, urinary incontinence, and dysuria    BREAST  Complaints: denies  Denies: breast lump, breast tenderness, changed mole, dryness, nipple discharge, pruritus, rash, skin color change, and skin lesion(s)  Last mammogram: 2023- BIRADS 2  Personal hx: none  Family hx: denies ovarian, or colon cancers  Maternal aunt breast cancer and uterine cancer- passed in her 60s  Patient does not practice breast self awareness.    GENERAL  PMH reviewed/updated and is as below.   Patient does follow with a PCP.  Works in a warehouse.  Exercise:  trying to get back into it  Diet: been working on it  Denies domestic violence.    Past Medical History:   Diagnosis Date    Anemia     Asthma     last assessed: 7/10/2014    Deep vein thrombosis (HCC)     Depression 2020    lost job    GERD (gastroesophageal reflux disease)     Migraines     Seasonal allergies     last assessed: 7/10/2014    Sleep apnea     Urinary incontinence     Varicose vein of leg        Past Surgical History:   Procedure Laterality Date    CYSTOSCOPY N/A 2017    Procedure: CYSTOSCOPY;  Surgeon: Delroy Negro MD;  [facility];  Service: Urology    ESOPHAGOGASTRODUODENOSCOPY      OK SLING OPERATION STRESS INCONTINENCE N/A 2017    Procedure: TRANSVAGINAL OBTURATOR BLADDER SLING;  Surgeon: Delroy Ngero MD;  [facility];  Service: Urology    OK STAB PHLEBT VARICOSE VEINS 1 XTR 10-20 STAB INCS Right 2016    Procedure: STAB PHLEBECTOMY OF LEG;  Surgeon: Adolph Ceballos MD;  [facility]: [facility];  Service: Vascular    TONSILLECTOMY      TUBAL LIGATION Bilateral     VARICOSE VEIN SURGERY Bilateral 1988    stripping and other procedures    WISDOM TOOTH EXTRACTION           Current Outpatient Medications:     ALPRAZolam (XANAX) 0.25 mg tablet, TAKE  "1 TABLET BY MOUTH DAILY AT BEDTIME AS NEEDED FOR ANXIETY, Disp: 20 tablet, Rfl: 0    cetirizine (ZyrTEC) 10 mg tablet, Take 10 mg by mouth daily as needed.  , Disp: , Rfl:     Cholecalciferol (VITAMIN D3) 2000 units TABS, Take 2,000 Units by mouth daily, Disp: , Rfl:     cyanocobalamin (VITAMIN B-12) 1,000 mcg tablet, Take by mouth daily, Disp: , Rfl:     doxycycline monohydrate (MONODOX) 100 mg capsule, , Disp: , Rfl:     estradiol (ESTRACE VAGINAL) 0.1 mg/g vaginal cream, Insert 0.5 g into the vagina 2 (two) times a week, Disp: 42.5 g, Rfl: 0    famotidine (PEPCID) 20 mg tablet, Take 20 mg by mouth daily., Disp: , Rfl:     Multiple Vitamins-Minerals (ONE-A-DAY WOMENS 50+ ADVANTAGE) TABS, Take by mouth, Disp: , Rfl:     trospium chloride (SANCTURA) 20 mg tablet, Take 1 tablet (20 mg total) by mouth 2 (two) times a day, Disp: 60 tablet, Rfl: 6    Veozah tablet, Take 45 mg by mouth daily, Disp: , Rfl:     Wheat Dextrin (BENEFIBER PO), Take by mouth, Disp: , Rfl:     azithromycin (ZITHROMAX) 500 MG tablet, Take 500 mg by mouth daily (Patient not taking: Reported on 5/9/2024), Disp: , Rfl:     buPROPion (WELLBUTRIN XL) 150 mg 24 hr tablet, , Disp: , Rfl:     cefuroxime (CEFTIN) 500 mg tablet, Take 500 mg by mouth every 12 (twelve) hours (Patient not taking: Reported on 5/9/2024), Disp: , Rfl:     estradiol (ESTRACE) 1 mg tablet, Take 1.5 mg by mouth daily, Disp: , Rfl:     Myrbetriq 25 MG TB24, Take 25 mg by mouth daily, Disp: , Rfl:     nitrofurantoin (MACROBID) 100 mg capsule, Take 100 mg by mouth 2 (two) times a day, Disp: , Rfl:     Progesterone 100 MG CAPS, Take 1 capsule by mouth daily, Disp: , Rfl:     Allergies   Allergen Reactions    Penicillins Hives, Swelling and Throat Swelling     Throat swelling    Sulfa Antibiotics Swelling and Rash     Throat swelling    Acetaminophen Dizziness     \"feels overmedicated\"    Mold Extract [Trichophyton] Itching    Morphine And Codeine      Severe migraineand nausea    " "Mushroom Extract Complex - Food Allergy     Codeine Rash, GI Intolerance and Headache    Latex Rash     Added based on information entered during case entry, please review and add reactions, type, and severity as needed       Social History     Socioeconomic History    Marital status:      Spouse name: Not on file    Number of children: Not on file    Years of education: Not on file    Highest education level: Not on file   Occupational History    Occupation:    Tobacco Use    Smoking status: Former     Current packs/day: 0.00     Types: Cigarettes     Quit date: 2004     Years since quittin.4    Smokeless tobacco: Never    Tobacco comments:     never smoker (as per allscripts)   Vaping Use    Vaping status: Never Used   Substance and Sexual Activity    Alcohol use: Not Currently    Drug use: Never    Sexual activity: Not Currently     Partners: Male     Birth control/protection: Post-menopausal, Female Sterilization     Comment: pt declines hiv std testing   Other Topics Concern    Not on file   Social History Narrative    Caffeine use     Social Determinants of Health     Financial Resource Strain: Not on file   Food Insecurity: Not on file   Transportation Needs: Not on file   Physical Activity: Not on file   Stress: Not on file   Social Connections: Not on file   Intimate Partner Violence: Not on file   Housing Stability: Not on file            Review of Systems  Review of Systems  Per HPI    Objective  /84 (BP Location: Left arm, Patient Position: Sitting, Cuff Size: Large)   Ht 5' 4\" (1.626 m)   Wt 95.3 kg (210 lb)   LMP 2016   BMI 36.05 kg/m²      Physical Exam:  Physical Exam  Vitals reviewed.   Constitutional:       Appearance: Normal appearance. She is not ill-appearing or diaphoretic.   HENT:      Head: Normocephalic and atraumatic.   Cardiovascular:      Rate and Rhythm: Normal rate.   Pulmonary:      Effort: Pulmonary effort is normal. No respiratory " distress.   Genitourinary:     Comments: Vulvar atrophy noted, urethra midline without prolapse, vagina and cervix appeared mildly atrophic.  No lesions.  No bleeding or discharge noted.  On bimanual exam there is an anteverted and mobile uterus with no tenderness on exam-exam overall limited by patient body habitus.  Skin:     General: Skin is warm and dry.   Neurological:      Mental Status: She is alert and oriented to person, place, and time.   Psychiatric:         Mood and Affect: Mood normal.         Behavior: Behavior normal.       Breast inspection negative, no nipple discharge or bleeding, no masses or nodularity palpable

## 2024-06-27 LAB
HPV HR 12 DNA CVX QL NAA+PROBE: NEGATIVE
HPV16 DNA CVX QL NAA+PROBE: NEGATIVE
HPV18 DNA CVX QL NAA+PROBE: NEGATIVE

## 2024-07-03 LAB
LAB AP GYN PRIMARY INTERPRETATION: NORMAL
Lab: NORMAL

## 2024-09-13 ENCOUNTER — OFFICE VISIT (OUTPATIENT)
Dept: OBGYN CLINIC | Facility: CLINIC | Age: 54
End: 2024-09-13
Payer: COMMERCIAL

## 2024-09-13 VITALS
SYSTOLIC BLOOD PRESSURE: 140 MMHG | HEIGHT: 64 IN | DIASTOLIC BLOOD PRESSURE: 80 MMHG | BODY MASS INDEX: 36.54 KG/M2 | WEIGHT: 214 LBS

## 2024-09-13 DIAGNOSIS — Z79.890 HORMONE REPLACEMENT THERAPY: Primary | ICD-10-CM

## 2024-09-13 PROCEDURE — 99214 OFFICE O/P EST MOD 30 MIN: CPT | Performed by: STUDENT IN AN ORGANIZED HEALTH CARE EDUCATION/TRAINING PROGRAM

## 2024-09-13 RX ORDER — ESTRADIOL 0.05 MG/D
1 PATCH TRANSDERMAL WEEKLY
Qty: 4 PATCH | Refills: 3 | Status: SHIPPED | OUTPATIENT
Start: 2024-09-13

## 2024-09-13 RX ORDER — PROGESTERONE 100 MG/1
1 CAPSULE ORAL DAILY
Qty: 30 CAPSULE | Refills: 3 | Status: SHIPPED | OUTPATIENT
Start: 2024-09-13

## 2024-09-13 RX ORDER — NEOMYCIN SULFATE, POLYMYXIN B SULFATE AND DEXAMETHASONE 3.5; 10000; 1 MG/ML; [USP'U]/ML; MG/ML
SUSPENSION/ DROPS OPHTHALMIC
COMMUNITY
Start: 2024-09-04

## 2024-09-13 RX ORDER — BUPROPION HYDROCHLORIDE 150 MG/1
TABLET, EXTENDED RELEASE ORAL
COMMUNITY
Start: 2024-06-14

## 2024-09-13 NOTE — PROGRESS NOTES
Ambulatory Visit  Name: Kasandra Burns      : 1970      MRN: [id number]  Encounter Provider: Kina Hicks MD  Encounter Date: 2024   Encounter department: [facility]    Assessment & Plan  Hormone replacement therapy  Kasandra is a 53-year-old postmenopausal female desiring hormone replacement therapy-patient counseled on hormone replacement therapy including the risks and benefits such as mixed data and slight increase of breast cancer and cardiac disease, protective nature against osteoporosis and colon cancer.  We reviewed trial of estrogen patch and oral progesterone to assist with her systemic symptoms including vasomotor symptoms, difficulty sleeping, brain fog, mood changes and vaginal dryness.  Alternatives such as Veozah and SSRIs have been reviewed as well as alternatives for vaginal dryness including vaginal Estrace cream which patient has been on.    Orders:    estradiol (Climara) 0.05 mg/24 hr; Place 1 patch on the skin over 7 days once a week    Progesterone 100 MG CAPS; Take 1 capsule by mouth daily      History of Present Illness     Kasandra Burns is a 53 y.o. female who presents presents to discuss hormone replacement therapy-patient have been on oral hormone replacement therapy in the past and did not feel like was working so took it for very short period of time.  She was initiated on vaginal Estrace with her reported improvement with vaginal symptoms but does still report systemic symptoms including frequent vasomotor symptoms, difficulty sleeping, weight gain, brain fog, mood changes.  She would like to discuss Estrace patch instead of doing the vaginal Estrace cream  given she does not like the application.    History obtained from : patient  Review of Systems   Constitutional: Negative.  Negative for chills, fatigue, fever and unexpected weight change.   HENT: Negative.  Negative for dental problem, sinus pressure and sinus pain.    Eyes:  "Negative.  Negative for visual disturbance.   Respiratory: Negative.  Negative for cough, shortness of breath and wheezing.    Cardiovascular: Negative.  Negative for chest pain and leg swelling.   Gastrointestinal: Negative.  Negative for constipation, diarrhea, nausea and vomiting.   Genitourinary: Negative.  Negative for menstrual problem, pelvic pain and urgency.   Musculoskeletal: Negative.  Negative for back pain.   Allergic/Immunologic: Positive for environmental allergies.   Neurological: Negative.  Negative for dizziness and headaches.           Objective     /80 (BP Location: Left arm, Patient Position: Sitting, Cuff Size: Large)   Ht 5' 4\" (1.626 m)   Wt 97.1 kg (214 lb)   LMP 08/09/2016   BMI 36.73 kg/m²     Physical Exam  Vitals reviewed.   Constitutional:       General: She is not in acute distress.     Appearance: She is not ill-appearing or diaphoretic.   HENT:      Head: Normocephalic and atraumatic.   Cardiovascular:      Rate and Rhythm: Normal rate.   Pulmonary:      Effort: Pulmonary effort is normal. No respiratory distress.   Musculoskeletal:      Right lower leg: No edema.      Left lower leg: No edema.   Skin:     General: Skin is warm and dry.   Neurological:      Mental Status: She is alert and oriented to person, place, and time.   Psychiatric:         Mood and Affect: Mood normal.         Behavior: Behavior normal.         "

## 2024-10-18 DIAGNOSIS — N95.2 VAGINAL ATROPHY: ICD-10-CM

## 2024-10-18 RX ORDER — ESTRADIOL 0.1 MG/G
0.5 CREAM VAGINAL 2 TIMES WEEKLY
Qty: 42.5 G | Refills: 1 | Status: SHIPPED | OUTPATIENT
Start: 2024-10-21

## 2024-10-18 NOTE — TELEPHONE ENCOUNTER
Reason for call:   [x] Refill   [] Prior Auth  [] Other:     Office:   [] PCP/Provider -   [x] Specialty/Provider - obgyn    Medication: estradiol (ESTRACE VAGINAL) 0.1 mg/g vaginal cream Insert 0.5 g into the vagina 2 (two) times a week       Pharmacy: [facility] [id number]      Does the patient have enough for 3 days?   [x] Yes   [] No - Send as HP to POD

## 2024-11-04 ENCOUNTER — HOSPITAL ENCOUNTER (OUTPATIENT)
Facility: HOSPITAL | Age: 54
Discharge: HOME | End: 2024-11-04
Payer: COMMERCIAL

## 2024-11-04 VITALS — HEIGHT: 64 IN | BODY MASS INDEX: 36.54 KG/M2 | WEIGHT: 214 LBS

## 2024-11-04 DIAGNOSIS — Z12.31 ENCOUNTER FOR SCREENING MAMMOGRAM FOR MALIGNANT NEOPLASM OF BREAST: ICD-10-CM

## 2024-11-04 PROCEDURE — 77067 SCR MAMMO BI INCL CAD: CPT

## 2024-11-04 PROCEDURE — 77063 BREAST TOMOSYNTHESIS BI: CPT

## 2024-12-17 ENCOUNTER — TELEPHONE (OUTPATIENT)
Age: 54
End: 2024-12-17

## 2024-12-17 NOTE — TELEPHONE ENCOUNTER
Pt called in to schedule a rheum consult . I advise the patient she will need a referral from her PCP . Provide pt with our central fax #

## 2024-12-23 NOTE — PROGRESS NOTES
Diagnoses and all orders for this visit:    Rectal bleeding       Rectal bleeding  Patient presents for evaluation of rectal bleeding.  Patient notes for the past 6 to 12 months she has had irritation of the anus with intermittent blood when wiping.  She notes a certain wetness about her anus.  She has been using multiple over-the-counter hemorrhoidal ointments without significant systemic relief.  Examination shows perianal skin excoriation with small perianal skin tags.  No large prolapsing internal hemorrhoids present.    Based upon patient's symptoms, this suggest more of a pleuritic pain a picture.  I suspect that her blood is likely from wiping.  She has had no prior colonoscopy, this is recommended to rule out more concerning proximal lesion.  I recommended over-the-counter topical cares for her anus cessation of steroid based compounds.  Risks and benefits of colonoscopy reviewed with patient to include, but not be limited to: anesthesia, bleeding, missed lesion and perforation requiring surgery.  Patient consents to procedure.      MATT Burns is a 54 y.o. female referred for evaluation today for bleeding hemorrhoids.       Blood on toilet tissue,leakage between bowel movements. Has this problem for 6 months. Constipation off and on. Bright red bleeding.      No colonoscopy   Past Medical History:   Diagnosis Date    Anemia     Asthma     last assessed: 7/10/2014    Deep vein thrombosis (HCC)     Depression 2/2020    lost job    GERD (gastroesophageal reflux disease)     Migraines     Seasonal allergies     last assessed: 7/10/2014    Sleep apnea     Urinary incontinence     Varicose vein of leg      Past Surgical History:   Procedure Laterality Date    CYSTOSCOPY N/A 11/13/2017    Procedure: CYSTOSCOPY;  Surgeon: Delroy Negro MD;  [facility];  Service: Urology    ESOPHAGOGASTRODUODENOSCOPY      [place] SLING OPERATION STRESS INCONTINENCE N/A 11/13/2017    Procedure: TRANSVAGINAL  OBTURATOR BLADDER SLING;  Surgeon: Delroy Negro MD;  [facility];  Service: Urology    [place] STAB PHLEBT VARICOSE VEINS 1 XTR 10-20 STAB INCS Right 06/17/2016    Procedure: STAB PHLEBECTOMY OF LEG;  Surgeon: Adolph Ceballos MD;  [facility];  Service: Vascular    TONSILLECTOMY      TUBAL LIGATION Bilateral     VARICOSE VEIN SURGERY Bilateral 1988    stripping and other procedures    WISDOM TOOTH EXTRACTION         Current Outpatient Medications:     ALPRAZolam (XANAX) 0.25 mg tablet, TAKE 1 TABLET BY MOUTH DAILY AT BEDTIME AS NEEDED FOR ANXIETY, Disp: 20 tablet, Rfl: 0    atoMOXetine (STRATTERA) 25 mg capsule, Take 25 mg by mouth 2 (two) times a day, Disp: , Rfl:     cetirizine (ZyrTEC) 10 mg tablet, Take 10 mg by mouth daily as needed.  , Disp: , Rfl:     Cholecalciferol (VITAMIN D3) 2000 units TABS, Take 2,000 Units by mouth daily, Disp: , Rfl:     cyanocobalamin (VITAMIN B-12) 1,000 mcg tablet, Take by mouth daily, Disp: , Rfl:     estradiol (Climara) 0.05 mg/24 hr, Place 1 patch on the skin over 7 days once a week, Disp: 4 patch, Rfl: 3    estradiol (ESTRACE VAGINAL) 0.1 mg/g vaginal cream, Insert 0.5 g into the vagina 2 (two) times a week, Disp: 42.5 g, Rfl: 1    famotidine (PEPCID) 20 mg tablet, Take 20 mg by mouth daily., Disp: , Rfl:     Multiple Vitamins-Minerals (ONE-A-DAY WOMENS 50+ ADVANTAGE) TABS, Take by mouth, Disp: , Rfl:     Progesterone 100 MG CAPS, Take 1 capsule by mouth daily, Disp: 30 capsule, Rfl: 3    trospium chloride (SANCTURA) 20 mg tablet, Take 1 tablet (20 mg total) by mouth 2 (two) times a day, Disp: 60 tablet, Rfl: 6    Wheat Dextrin (BENEFIBER PO), Take by mouth, Disp: , Rfl:     azithromycin (ZITHROMAX) 500 MG tablet, Take 500 mg by mouth daily (Patient not taking: Reported on 5/9/2024), Disp: , Rfl:     buPROPion (WELLBUTRIN SR) 150 mg 12 hr tablet, , Disp: , Rfl:     buPROPion (WELLBUTRIN XL) 150 mg 24 hr tablet, , Disp: , Rfl:     cefuroxime (CEFTIN) 500 mg tablet,  Take 500 mg by mouth every 12 (twelve) hours (Patient not taking: Reported on 5/9/2024), Disp: , Rfl:     doxycycline monohydrate (MONODOX) 100 mg capsule, , Disp: , Rfl:     Myrbetriq 25 MG TB24, Take 25 mg by mouth daily, Disp: , Rfl:     neomycin-polymyxin-dexamethasone (MAXITROL) ophthalmic suspension, INSTILL 1 TO 2 DROPS TO AFFECTED EYES EVERY 4 HOURS FOR 5 DAYS, Disp: , Rfl:     nitrofurantoin (MACROBID) 100 mg capsule, Take 100 mg by mouth 2 (two) times a day, Disp: , Rfl:   Allergies as of 12/26/2024 - Reviewed 12/26/2024   Allergen Reaction Noted    Penicillins Hives, Swelling, and Throat Swelling 07/10/2014    Sulfa antibiotics Swelling and Rash 06/13/2016    Acetaminophen Dizziness 07/10/2014    Mold extract [trichophyton] Itching 06/13/2016    Morphine and codeine  06/13/2016    Mushroom extract complex - food allergy  06/05/2019    Codeine Rash, GI Intolerance, and Headache 01/09/2015    Latex Rash 10/26/2017     Review of Systems   Gastrointestinal:  Positive for anal bleeding, constipation and rectal pain.   All other systems reviewed and are negative.    Vitals:    12/26/24 0825   BP: 138/80     Physical Exam  Constitutional:       Appearance: Normal appearance.   HENT:      Head: Normocephalic and atraumatic.   Eyes:      Extraocular Movements: Extraocular movements intact.      Pupils: Pupils are equal, round, and reactive to light.   Skin:     General: Skin is warm and dry.   Neurological:      General: No focal deficit present.      Mental Status: She is alert and oriented to person, place, and time.   Psychiatric:         Mood and Affect: Mood normal.         Behavior: Behavior normal.         Thought Content: Thought content normal.         Judgment: Judgment normal.     Lower Endoscopy    Date/Time: 12/26/2024 8:20 AM    Performed by: Steve Francois MD  Authorized by: Steve Francois MD    Verbal consent obtained?: Yes    Risks and benefits: Risks, benefits and alternatives were  discussed    Consent given by:  Patient  Indications: hemorrhoids    Patient sedated: No    Scope type:  Anoscope  External exam performed: Yes    Pilonidal sinus tract: No    Pilonidal cyst: No    Pilonidal tenderness: No    Perianal skin tags: Yes    Perirectal warts: No    Perianal maceration: Yes    Perianal induration: No    Perianal erythema: No    External hemorrhoids: No    Digital exam performed: Yes    Laxity of anal sphincter: Yes    Internal hemorrhoids: Yes    Prolapsed: No    Intraluminal mass: No    Inflammation: No    Anal fissures: No    Anal fistulae: No    Anal stricture: No    Abscess: No    Procedure termination:  Procedure complete  Patient tolerance:  Patient tolerated the procedure well with no immediate complications

## 2024-12-26 ENCOUNTER — OFFICE VISIT (OUTPATIENT)
Age: 54
End: 2024-12-26
Payer: COMMERCIAL

## 2024-12-26 ENCOUNTER — PREP FOR PROCEDURE (OUTPATIENT)
Age: 54
End: 2024-12-26

## 2024-12-26 ENCOUNTER — TELEPHONE (OUTPATIENT)
Age: 54
End: 2024-12-26

## 2024-12-26 VITALS — DIASTOLIC BLOOD PRESSURE: 80 MMHG | WEIGHT: 217 LBS | BODY MASS INDEX: 37.25 KG/M2 | SYSTOLIC BLOOD PRESSURE: 138 MMHG

## 2024-12-26 DIAGNOSIS — Z12.11 ENCOUNTER FOR SCREENING COLONOSCOPY: Primary | ICD-10-CM

## 2024-12-26 DIAGNOSIS — K62.5 RECTAL BLEEDING: Primary | ICD-10-CM

## 2024-12-26 PROCEDURE — 46600 DIAGNOSTIC ANOSCOPY SPX: CPT | Performed by: COLON & RECTAL SURGERY

## 2024-12-26 PROCEDURE — 99243 OFF/OP CNSLTJ NEW/EST LOW 30: CPT | Performed by: COLON & RECTAL SURGERY

## 2024-12-26 RX ORDER — ATOMOXETINE 25 MG/1
25 CAPSULE ORAL 2 TIMES DAILY
COMMUNITY

## 2024-12-26 NOTE — ASSESSMENT & PLAN NOTE
Patient presents for evaluation of rectal bleeding.  Patient notes for the past 6 to 12 months she has had irritation of the anus with intermittent blood when wiping.  She notes a certain wetness about her anus.  She has been using multiple over-the-counter hemorrhoidal ointments without significant systemic relief.  Examination shows perianal skin excoriation with small perianal skin tags.  No large prolapsing internal hemorrhoids present.    Based upon patient's symptoms, this suggest more of a pleuritic pain a picture.  I suspect that her blood is likely from wiping.  She has had no prior colonoscopy, this is recommended to rule out more concerning proximal lesion.  I recommended over-the-counter topical cares for her anus cessation of steroid based compounds.  Risks and benefits of colonoscopy reviewed with patient to include, but not be limited to: anesthesia, bleeding, missed lesion and perforation requiring surgery.  Patient consents to procedure.

## 2024-12-26 NOTE — TELEPHONE ENCOUNTER
OV 12/26/24, hemorrhoids; no previous FC; BMI 37    Patient scheduled for 1/27/25 at  with Dr. Francois, paperwork handed to patient.

## 2024-12-26 NOTE — LETTER
Kasandra Burns  [address] [place], [place] [address]        Location:  [place] [address]    Performing Physician: Steve Francois MD      DATE OF PROCEDURE: 1/27/2025 TIME OF PROCEDURE:                                 The OR/GI Lab will contact you the evening prior to your procedure with your exact arrival time.    We kindly ask that you immediately notify us of any need to cancel or reschedule your procedure.      WEEK BEFORE THE PROCEDURE:  Do not take Iron tablets for one week  5 days prior to the appointment AVOID vegetables and fruits with skins or seeds, nuts, corn, popcorn, and whole grain breads.  Purchase: One (1) 238-gram container of Miralax (polyethylene glycol 3350), four (4) 5 mg Dulcolax (bisacodyl) tablets, and 64-ounces of Gatorade (sports drink) - no red, orange, or purple. These may be purchased at any pharmacy without a prescription. Generic products are permissible.  Arrange responsible transportation for day of the procedure.      DAY BEFORE THE PROCEDURE:  CLEAR liquids only for entire day prior. Nothing red, orange or purple.  You MAY have:  Soda  Water  Broth Gatorade  Jell-O  Popsicles Coffee/tea without  Milk/creamer     YOU MAY NOT HAVE:  Solid foods  Milk and milk products  Juice with pulp              BOWEL PREPARATION: Includes: One (1) 238-gram container of Miralax (polyethylene glycol 3350), four (4) 5 mg Dulcolax (bisacodyl) tablets, and 64-ounces of Gatorade (sports drink). Preparation may be refrigerated. Entire bowel prep should be completed.    Afternoon before the procedure (2:00 pm - 5:00 pm):  Take two (2) 5 mg Dulcolax laxative tablets.    Evening before the procedure (6:00 pm):  Mix entire container of Miralax with 64-ounces of Gatorade and shake until all medication is dissolved.  Begin drinking solution. Drink an eight (8) ounce cup every 10-15 minutes until you have consumed half (32 ounces) of the solution. Refrigerate remaining  solution.    Night before the procedure (8:00 pm):  Take two (2) 5 mg Dulcolax laxative tablets.    Beginning 5 hours before your procedure:  Drink the remaining amount of prepared solution (32 ounces). Drink an eight (8) ounce cup every 10-15 minutes until you have consumed the remaining solution.      Bowel prep should be completed 4 hours prior to procedure time.  NOTHING TO EAT OR DRINK AFTER MIDNIGHT -EXCEPT YOUR BOWEL PREP                                                                                                                                                                                DAY OF THE PROCEDURE:  You may brush your teeth.  Leave all jewelry at home. Take out any facial piercings, if able.  Please arrive for your procedure as indicated by the OR / GI Lab / Endoscopy Unit. The hospital will contact you the day before with your exact arrival time.  Make sure you have arranged ahead of time for a responsible adult (18 or older) to accompany and drive you home after the procedure. Please discuss any transportation concerns with our staff prior to your procedure.  The effects of the anesthesia can persist for 24 hours. After receiving the sedation, you must exercise caution before engaging in any activity that could harm yourself and others (such as driving a car). Do not make any important decisions or do not drink any alcoholic beverages during this time period.  After your procedure, you may have anything you’d like to eat or drink. You will probably want to start with something light. Please include plenty of fluids. Avoid items that cause gas such as sodas and salads.      SPECIAL INSTRUCTIONS:    For patients currently taking blood thinners and/or antiplatelet therapy our office will contact the prescribing provider. Our office will contact you with any required changes to your medication regimen.  Blood thinner (i.e. - Coumadin, Pradaxa, Lovenox, Xarelto, Eliquis)  N/A  Antiplatelet (i.e.  - Plavix, Aggrenox, Effient, Brilinta)  N/A    Diabetes:  If you are Diabetic, please see separate Diabetic Instruction Sheet.  Prescribed medications:  Do not stop your aspirin, or any of your other medications (unless instructed otherwise).  Take the rest of your prescribed medications with small sips of water at least 2 hours prior to your procedure.      NOTHING TO EAT OR DRINK (INCLUDING CHEWING GUM) AFTER 12 MIDNIGHT PRIOR TO YOUR PROCEDURE EXCEPT YOUR BOWEL PREP.        For any questions or concerns related to your bowel preparation or pre-procedure instructions, please contact our office.  Thank you for choosing [facility]’s Colon & Rectal Surgery!    Revised 1/2023    [phone]

## 2024-12-27 ENCOUNTER — OFFICE VISIT (OUTPATIENT)
Dept: OBGYN CLINIC | Facility: CLINIC | Age: 54
End: 2024-12-27
Payer: COMMERCIAL

## 2024-12-27 VITALS
WEIGHT: 219 LBS | SYSTOLIC BLOOD PRESSURE: 140 MMHG | BODY MASS INDEX: 37.39 KG/M2 | HEIGHT: 64 IN | DIASTOLIC BLOOD PRESSURE: 78 MMHG

## 2024-12-27 DIAGNOSIS — R76.8 ANA POSITIVE: ICD-10-CM

## 2024-12-27 DIAGNOSIS — Z79.890 HORMONE REPLACEMENT THERAPY (HRT): Primary | ICD-10-CM

## 2024-12-27 PROCEDURE — 99213 OFFICE O/P EST LOW 20 MIN: CPT | Performed by: STUDENT IN AN ORGANIZED HEALTH CARE EDUCATION/TRAINING PROGRAM

## 2024-12-27 RX ORDER — ESTRADIOL 0.1 MG/D
1 PATCH TRANSDERMAL WEEKLY
Qty: 4 PATCH | Refills: 3 | Status: SHIPPED | OUTPATIENT
Start: 2024-12-27

## 2024-12-27 NOTE — PROGRESS NOTES
Name: Kasandra Burns      : 1970      MRN: [id number]  Encounter Provider: Kina Hicks MD  Encounter Date: 2024   Encounter department: [facility]  :  Assessment & Plan  Hormone replacement therapy (HRT)  Patient has noticed some improvement climara patch particularly with VMS but would like to try a higher dose to see if this help energy and weight loss attempts. Would be amendable to decreasing back to 0.5 if no difference is noted. We again reviewed risks benefits and alternatives and she would like to proceed.   Orders:    estradiol (Climara) 0.1 mg/24 hr; Place 1 patch on the skin over 7 days once a week    BMI 37.0-37.9, adult  Patient has concerns with inability to lose weight despite dieting and exercise.  Normal thyroid testing this month  She is amendable to referral to weight loss management which was placed for her today.  Orders:    Ambulatory Referral to Weight Management; Future    HUGO positive  Patient with HUGO positive blood work- requesting referral for Rheumatology which was placed for her to see Dr. Connelly   Orders:    Ambulatory Referral to Rheumatology; Future        History of Present Illness   HPI  Kasandra Burns is a 54 y.o. female who presents to as a follow-up for hormonal replacement therapy patient does note improvement in vasomotor symptoms and mood changes-but is interesting in increasing her HRT dose given ongoing concerns of other symptoms of fatigue inability to lose weight.  Patient reports recent blood work that was positive for HUGO and she does plan on following with rheumatologist but is requesting referral given her current rheumatologist is not accepting visits until .    Patient is interested in weight loss and has trialed diet and exercise but has been unsuccessful.  Has trialed weight loss management program in the past with good success of loss of 30-40 and would be interested in referral back.lbs     Review of  "Systems   Constitutional:  Negative for chills, fatigue, fever and unexpected weight change.   HENT:  Negative for dental problem, sinus pressure and sinus pain.    Eyes:  Negative for visual disturbance.   Respiratory:  Negative for cough, shortness of breath and wheezing.    Cardiovascular:  Negative for chest pain and leg swelling.   Gastrointestinal:  Negative for constipation, diarrhea, nausea and vomiting.   Genitourinary:  Negative for menstrual problem, pelvic pain and urgency.   Musculoskeletal:  Negative for back pain.   Allergic/Immunologic: Negative for environmental allergies.   Neurological:  Negative for dizziness and headaches.        Objective   /78 (BP Location: Left arm, Patient Position: Sitting, Cuff Size: Standard)   Ht 5' 4\" (1.626 m)   Wt 99.3 kg (219 lb)   LMP 08/09/2016   BMI 37.59 kg/m²      Physical Exam  Vitals reviewed.   Constitutional:       General: She is not in acute distress.     Appearance: Normal appearance. She is obese. She is not ill-appearing or diaphoretic.   HENT:      Head: Normocephalic and atraumatic.   Cardiovascular:      Rate and Rhythm: Normal rate.   Pulmonary:      Effort: Pulmonary effort is normal. No respiratory distress.   Musculoskeletal:      Right lower leg: No edema.      Left lower leg: No edema.   Skin:     General: Skin is warm and dry.   Neurological:      Mental Status: She is alert and oriented to person, place, and time.   Psychiatric:         Mood and Affect: Mood normal.         Behavior: Behavior normal.           "

## 2025-01-09 ENCOUNTER — TELEPHONE (OUTPATIENT)
Age: 55
End: 2025-01-09

## 2025-01-09 NOTE — TELEPHONE ENCOUNTER
Manjula from [facility] called to see if patient could be seen sooner. I advised her of openings in [address] office this month. She stated she will coordinate with patient and call back to schedule time/day. Thank you.

## 2025-01-17 ENCOUNTER — TELEPHONE (OUTPATIENT)
Age: 55
End: 2025-01-17

## 2025-01-17 NOTE — TELEPHONE ENCOUNTER
Pt calling in with questions on bowel prep and what is okay to eat week prior to colonoscopy. I reviewed all instructions with her. She understood. No further questions

## 2025-01-21 NOTE — TELEPHONE ENCOUNTER
Pt calling in, she is asking what she can eat prior to colonoscopy. I reviewed information with her. No further questions

## 2025-01-22 NOTE — PROGRESS NOTES
Rheumatology Initial Outpatient Visit  Name: Kasandra Burns      : 1970      MRN: [id number]  Encounter Provider: Melva Godinez MD  Encounter Date: 2025   Encounter department: [facility] [address]  :  Assessment & Plan  HUGO positive  54-year-old female who presents for further evaluation of low titer HUGO 1: 160.  Lab was checked in workup of ongoing symptoms as detailed below.  Patient does admit to fingertips turning white in the cold weather which could be related to Raynaud's syndrome.  She also admits to chronic dry eye, dry mouth, vaginal dryness and worsening skin dryness.  She admits to pain and stiffness in her knees and ankles, though description of symptoms is noninflammatory.  Discussed with patient that a low titer HUGO can be positive at a variety of settings and up to 20% of the normal healthy population may have a detectable HUGO without an underlying autoimmune disease.  However, due to the possible new onset Raynaud's and dryness, I recommend screening for SLE, Sjogren's, and systemic sclerosis with serologies as below.  I will also obtain x-rays of the knees and ankles, though suspect likely some early OA in the knees and suspect that the ankle stiffness is more related to significant edema.  Range of motion of the ankles are totally normal which makes true ankle effusion unlikely.  Discussed with patient that some of the other symptoms that she has been experiencing are not specific to any autoimmune disease, such as weight gain, so even if she does test positive for one of the visions I do not think that this would totally explain all of the symptoms that she has been experiencing.  Patient will follow-up in 2 weeks to review results.  Orders:    Ambulatory Referral to Rheumatology    C3 complement; Future    C4 complement; Future    Sjogren's Antibodies; Future    Anti-DNA antibody, double-stranded; Future    Anti-U1 RNP Ab (RDL); Future    Plasencia; Anti-Plasencia;  Future    Anti-scleroderma antibody; Future    Centromere Antibody; Future    Anti-Brittany 1 Antibody; Future    Urinalysis with microscopic; Future    Protein / creatinine ratio, urine; Future    XR knee 3 vw right non injury; Future    XR knee 3 vw left non injury; Future    XR ankle 3+ vw left; Future    XR ankle 3+ vw right; Future        History of Present Illness   HPI  Kasandra Burns is a 54 y.o. female who presents for evaluation of positive HUGO.  Patient reports that she has been having ongoing symptoms without clear underlying cause.  Patient reports severe fatigue and low energy.  Patient reports joint pain worst in her knees and ankles.  Reports that this has been going on for a year.  Went her knees bother her they will hurt for weeks at a time before improving.  Her ankles bother her daily.  She has swelling and stiffness in her ankles that is most prominent as the day goes on/at the end of the day.  She has general stiffness for a few minutes in the morning.  She has not noticed any swelling.  She reports that she had a Severe back pain that happened after shoveling her driveway.    She reports that she was diagnosed with Lyme disease in March 2024 and did antibiotics for 1 month but her symptoms have been ongoing.  Reports that she feels her abdomen has gotten larger and her weight has been going up.  Tries to make changes to her diet but she has not been able to lose weight.    Admits to easy bruising.  Admits to facial flushing on her cheeks and forehead whenever she is stressed.  She feels very sensitive to temperatures such as cold or heat.  She notes that her hands are always cold.  She does think that her fingertips have turned white when exposed to cold but is unsure if they have turned blue or red.  Experiences tightness in her chest also when she is stressed.  Chronic dry eye, dry mouth, vaginal dryness, dry skin is getting worse.  Uses eyedrops 2-3 times a day.  Sometimes has trouble  "swallowing dry foods  Varicose veins of ankle  Sometimes feels confused, recently diagnosed with ADHD but she is unsure if she has this  More frequent headaches with blurry vision.  Has seen eye doctor with no abnormal findings  Chronic UTIs and urinary frequency  Sometimes sensitive to light and noise  1 episode of ulcers on her gums but no hard palate ulcerations    There is no family history of autoimmune disease    Review of Systems  Complete ROS conducted as per HPI. In addition, denies:  Fever  Photosensitive rash  Chest pain  SOB  Pleurisy  Gross hematuria  Joint issues other than noted above      Objective   /76 (BP Location: Right arm, Patient Position: Sitting, Cuff Size: Adult)   Pulse 76   Temp 98.2 °F (36.8 °C) (Tympanic)   Ht 5' 4\" (1.626 m)   Wt 97.1 kg (214 lb)   LMP 08/09/2016   SpO2 97%   BMI 36.73 kg/m²      Physical Exam  Physical Exam  Constitutional: well appearing, no acute distress  HEENT: normocephalic, sclera clear, no visible oral or nasal ulcers  Neck: supple, no palpable cervical adenopathy  CV: regular rate and rhythm, no murmur  Pulm: normal respiratory effort, lungs clear to auscultation b/l  Skin: Dry skin of the bilateral hands with cracks on both thumbs, no skin thickening  Extremities: warm and well perfused, nonpitting edema of the bilateral lower extremities  MSK: No synovitis or effusions, normal range of motion throughout    Labs and Imaging  I have personally reviewed pertinent labs and imaging.     HUGO 1:160  CBC shows WBC 3.6 with low neutrophils  RF negative  "

## 2025-01-24 ENCOUNTER — TELEPHONE (OUTPATIENT)
Age: 55
End: 2025-01-24

## 2025-01-24 NOTE — TELEPHONE ENCOUNTER
Pt called for arrival time. Advised nothing assigned yet. She will receive a call between 2 and 6pm.

## 2025-01-27 ENCOUNTER — ANESTHESIA (OUTPATIENT)
Dept: GASTROENTEROLOGY | Facility: HOSPITAL | Age: 55
End: 2025-01-27
Payer: COMMERCIAL

## 2025-01-27 ENCOUNTER — HOSPITAL ENCOUNTER (OUTPATIENT)
Dept: GASTROENTEROLOGY | Facility: HOSPITAL | Age: 55
Setting detail: OUTPATIENT SURGERY
Discharge: HOME | End: 2025-01-27
Attending: COLON & RECTAL SURGERY | Admitting: COLON & RECTAL SURGERY
Payer: COMMERCIAL

## 2025-01-27 ENCOUNTER — ANESTHESIA EVENT (OUTPATIENT)
Dept: GASTROENTEROLOGY | Facility: HOSPITAL | Age: 55
End: 2025-01-27
Payer: COMMERCIAL

## 2025-01-27 VITALS
BODY MASS INDEX: 36.02 KG/M2 | OXYGEN SATURATION: 98 % | SYSTOLIC BLOOD PRESSURE: 131 MMHG | DIASTOLIC BLOOD PRESSURE: 78 MMHG | WEIGHT: 211 LBS | RESPIRATION RATE: 18 BRPM | TEMPERATURE: 97.9 F | HEIGHT: 64 IN | HEART RATE: 78 BPM

## 2025-01-27 DIAGNOSIS — K62.5 RECTAL BLEEDING: Primary | ICD-10-CM

## 2025-01-27 DIAGNOSIS — Z12.11 ENCOUNTER FOR SCREENING COLONOSCOPY: ICD-10-CM

## 2025-01-27 PROCEDURE — 45378 DIAGNOSTIC COLONOSCOPY: CPT | Performed by: COLON & RECTAL SURGERY

## 2025-01-27 RX ORDER — SODIUM CHLORIDE, SODIUM LACTATE, POTASSIUM CHLORIDE, CALCIUM CHLORIDE 600; 310; 30; 20 MG/100ML; MG/100ML; MG/100ML; MG/100ML
INJECTION, SOLUTION INTRAVENOUS CONTINUOUS PRN
Status: DISCONTINUED | OUTPATIENT
Start: 2025-01-27 | End: 2025-01-27

## 2025-01-27 RX ORDER — PROPOFOL 10 MG/ML
INJECTION, EMULSION INTRAVENOUS AS NEEDED
Status: DISCONTINUED | OUTPATIENT
Start: 2025-01-27 | End: 2025-01-27

## 2025-01-27 RX ADMIN — PROPOFOL 50 MG: 10 INJECTION, EMULSION INTRAVENOUS at 07:42

## 2025-01-27 RX ADMIN — SODIUM CHLORIDE, SODIUM LACTATE, POTASSIUM CHLORIDE, AND CALCIUM CHLORIDE: .6; .31; .03; .02 INJECTION, SOLUTION INTRAVENOUS at 07:20

## 2025-01-27 RX ADMIN — PROPOFOL 50 MG: 10 INJECTION, EMULSION INTRAVENOUS at 07:33

## 2025-01-27 RX ADMIN — PROPOFOL 100 MG: 10 INJECTION, EMULSION INTRAVENOUS at 07:32

## 2025-01-27 RX ADMIN — PROPOFOL 50 MG: 10 INJECTION, EMULSION INTRAVENOUS at 07:34

## 2025-01-27 RX ADMIN — PROPOFOL 50 MG: 10 INJECTION, EMULSION INTRAVENOUS at 07:40

## 2025-01-27 RX ADMIN — PROPOFOL 50 MG: 10 INJECTION, EMULSION INTRAVENOUS at 07:37

## 2025-01-27 RX ADMIN — PROPOFOL 50 MG: 10 INJECTION, EMULSION INTRAVENOUS at 07:46

## 2025-01-27 NOTE — ANESTHESIA POSTPROCEDURE EVALUATION
Post-Op Assessment Note    CV Status:  Stable  Pain Score: 0    Pain management: adequate       Mental Status:  Sleepy and arousable   Hydration Status:  Stable   PONV Controlled:  Controlled   Airway Patency:  Patent     Post Op Vitals Reviewed: Yes    No anethesia notable event occurred.    Staff: CRNA           Last Filed PACU Vitals:  Vitals Value Taken Time   Temp     Pulse 75    /79    Resp 11    SpO2 99             Sleepy but conversing with staff with eyes closed.

## 2025-01-27 NOTE — ANESTHESIA PREPROCEDURE EVALUATION
Procedure:  COLONOSCOPY    Relevant Problems   CARDIO   (+) Varicose vein of leg   (+) Varicose veins of left lower extremity with pain      ENDO   (+) Hyperparathyroidism (HCC)      GI/HEPATIC   (+) GERD (gastroesophageal reflux disease)   (+) Rectal bleeding      HEMATOLOGY   (+) Iron deficiency anemia      NEURO/PSYCH   (+) Anxiety      NPO 4am prep    Physical Exam    Airway    Mallampati score: II  TM Distance: >3 FB  Neck ROM: full     Dental       Cardiovascular  Cardiovascular exam normal    Pulmonary  Pulmonary exam normal     Other Findings  post-pubertal.      Anesthesia Plan  ASA Score- 2     Anesthesia Type- IV sedation with anesthesia with ASA Monitors.         Additional Monitors:     Airway Plan:            Plan Factors-Exercise tolerance (METS): >4 METS.    Chart reviewed. EKG reviewed.  Existing labs reviewed. Patient summary reviewed.          Obstructive sleep apnea risk education given perioperatively.        Induction- intravenous.    Postoperative Plan-     Perioperative Resuscitation Plan - Level 1 - Full Code.       Informed Consent- Anesthetic plan and risks discussed with patient and daughter.  I personally reviewed this patient with the CRNA. Discussed and agreed on the Anesthesia Plan with the CRNA..      NPO Status:  No vitals data found for the desired time range.

## 2025-01-27 NOTE — H&P
History and Physical   Colon and Rectal Surgery   Kasandra Burns 54 y.o. female MRN: [id number]  Unit/Bed#:  Encounter: [id number]  01/27/25   @NOW    No chief complaint on file.        History of Present Illness   HPI:  Kasandra Burns is a 54 y.o. female who presents for evaluation of rectal bleeding.      Historical Information   Past Medical History:   Diagnosis Date    Anemia     Asthma     last assessed: 7/10/2014    Deep vein thrombosis (HCC)     Depression 2/2020    lost job    GERD (gastroesophageal reflux disease)     Migraines     Seasonal allergies     last assessed: 7/10/2014    Sleep apnea     Urinary incontinence     Varicose vein of leg      Past Surgical History:   Procedure Laterality Date    CYSTOSCOPY N/A 11/13/2017    Procedure: CYSTOSCOPY;  Surgeon: Delroy Negro MD;  Location: [facility];  Service: Urology    ESOPHAGOGASTRODUODENOSCOPY      NH SLING OPERATION STRESS INCONTINENCE N/A 11/13/2017    Procedure: TRANSVAGINAL OBTURATOR BLADDER SLING;  Surgeon: Delroy Negro MD;  Location: [facility];  Service: Urology    NH STAB PHLEBT VARICOSE VEINS 1 XTR 10-20 STAB INCS Right 06/17/2016    Procedure: STAB PHLEBECTOMY OF LEG;  Surgeon: Adolph Ceballos MD;  Location: [facility];  Service: Vascular    TONSILLECTOMY      TUBAL LIGATION Bilateral     VARICOSE VEIN SURGERY Bilateral 1988    stripping and other procedures    WISDOM TOOTH EXTRACTION         Meds/Allergies     Not in a hospital admission.      Current Outpatient Medications:     cetirizine (ZyrTEC) 10 mg tablet, Take 10 mg by mouth daily as needed.  , Disp: , Rfl:     cyanocobalamin (VITAMIN B-12) 1,000 mcg tablet, Take by mouth daily, Disp: , Rfl:     famotidine (PEPCID) 20 mg tablet, Take 20 mg by mouth daily., Disp: , Rfl:     Multiple Vitamins-Minerals (ONE-A-DAY WOMENS 50+ ADVANTAGE) TABS, Take by mouth, Disp: , Rfl:     Progesterone 100 MG CAPS, Take 1 capsule by mouth daily, Disp: 30 capsule, Rfl: 3    trospium chloride  "(SANCTURA) 20 mg tablet, Take 1 tablet (20 mg total) by mouth 2 (two) times a day, Disp: 60 tablet, Rfl: 6    ALPRAZolam (XANAX) 0.25 mg tablet, TAKE 1 TABLET BY MOUTH DAILY AT BEDTIME AS NEEDED FOR ANXIETY, Disp: 20 tablet, Rfl: 0    atoMOXetine (STRATTERA) 25 mg capsule, Take 25 mg by mouth 2 (two) times a day, Disp: , Rfl:     azithromycin (ZITHROMAX) 500 MG tablet, Take 500 mg by mouth daily (Patient not taking: Reported on 5/9/2024), Disp: , Rfl:     buPROPion (WELLBUTRIN SR) 150 mg 12 hr tablet, , Disp: , Rfl:     buPROPion (WELLBUTRIN XL) 150 mg 24 hr tablet, , Disp: , Rfl:     cefuroxime (CEFTIN) 500 mg tablet, Take 500 mg by mouth every 12 (twelve) hours (Patient not taking: Reported on 5/9/2024), Disp: , Rfl:     Cholecalciferol (VITAMIN D3) 2000 units TABS, Take 2,000 Units by mouth daily, Disp: , Rfl:     doxycycline monohydrate (MONODOX) 100 mg capsule, , Disp: , Rfl:     estradiol (Climara) 0.05 mg/24 hr, Place 1 patch on the skin over 7 days once a week, Disp: 4 patch, Rfl: 3    estradiol (Climara) 0.1 mg/24 hr, Place 1 patch on the skin over 7 days once a week, Disp: 4 patch, Rfl: 3    estradiol (ESTRACE VAGINAL) 0.1 mg/g vaginal cream, Insert 0.5 g into the vagina 2 (two) times a week, Disp: 42.5 g, Rfl: 1    Myrbetriq 25 MG TB24, Take 25 mg by mouth daily, Disp: , Rfl:     neomycin-polymyxin-dexamethasone (MAXITROL) ophthalmic suspension, INSTILL 1 TO 2 DROPS TO AFFECTED EYES EVERY 4 HOURS FOR 5 DAYS, Disp: , Rfl:     nitrofurantoin (MACROBID) 100 mg capsule, Take 100 mg by mouth 2 (two) times a day, Disp: , Rfl:     Wheat Dextrin (BENEFIBER PO), Take by mouth, Disp: , Rfl:     Allergies   Allergen Reactions    Penicillins Hives, Swelling and Throat Swelling     Throat swelling    Sulfa Antibiotics Swelling and Rash     Throat swelling    Acetaminophen Dizziness     \"feels overmedicated\"    Mold Extract [Trichophyton] Itching    Morphine And Codeine      Severe migraineand nausea    Mushroom " "Extract Complex - Food Allergy     Codeine Rash, GI Intolerance and Headache    Latex Rash     Added based on information entered during case entry, please review and add reactions, type, and severity as needed         Social History   Social History     Substance and Sexual Activity   Alcohol Use Not Currently     Social History     Substance and Sexual Activity   Drug Use Never     Social History     Tobacco Use   Smoking Status Former    Current packs/day: 0.00    Types: Cigarettes    Quit date: 2004    Years since quittin.0   Smokeless Tobacco Never   Tobacco Comments    never smoker (as per allscripts)         Family History:   Family History   Problem Relation Age of Onset    Bipolar disorder Mother     Anxiety disorder Mother         and depression    Hypertension Mother     Hypothyroidism Mother     Depression Mother         and anxiety    Migraines Mother     COPD Father     Peripheral vascular disease Father     Coronary artery disease Father     Diabetes Father             Emphysema Father     Anxiety disorder Father         and depression    Depression Father         and anxiety    Arthritis Father     Other Father         cardiac disorder    Hypertension Father             Cancer Father     Heart disease Father     Lung disease Father     No Known Problems Daughter     No Known Problems Daughter     Hypertension Brother     Hypertension Brother     Eczema Brother     Cancer Maternal Aunt     Deep vein thrombosis Maternal Aunt     Breast cancer Maternal Aunt 65    Dementia Maternal Aunt     Uterine cancer Maternal Aunt          Objective     Current Vitals:   Blood Pressure: 140/77 (25)  Pulse: 84 (25)  Temperature: 97.6 °F (36.4 °C) (25)  Respirations: 16 (25)  Height: 5' 4\" (162.6 cm) (25)  Weight - Scale: 95.7 kg (211 lb) (25)  SpO2: 99 % (25)  No intake or output data in the 24 hours ending 25 " 0712    Physical Exam:  General:  Resting comfortably in bed   Eyes:Sclera anicteric  ENT: Trachea midline  Pulm:  Symmetric chest raise.  No respiratory Distress  CV:  Regular on monitor  Abdomen:  Soft NT ND  Extremities:  No clubbing/ cyanosis/ edema    Lab Results: I have personally reviewed pertinent lab results.    Imaging: Results Review Statement: No pertinent imaging studies reviewed.      ASSESSMENT:  Kasandra Burns is a 54 y.o. female who presents for outpatient colonoscopy.      PLAN:  For colonoscopy    Risks/ Benefits reviewed to include but not limited to anesthesia, bleeding, missed lesions, and colonoscopic perforation requiring surgery.

## 2025-01-29 ENCOUNTER — CONSULT (OUTPATIENT)
Age: 55
End: 2025-01-29
Payer: COMMERCIAL

## 2025-01-29 VITALS
TEMPERATURE: 98.2 F | SYSTOLIC BLOOD PRESSURE: 132 MMHG | WEIGHT: 214 LBS | OXYGEN SATURATION: 97 % | BODY MASS INDEX: 36.54 KG/M2 | HEIGHT: 64 IN | DIASTOLIC BLOOD PRESSURE: 76 MMHG | HEART RATE: 76 BPM

## 2025-01-29 DIAGNOSIS — R76.8 ANA POSITIVE: ICD-10-CM

## 2025-01-29 PROCEDURE — 99244 OFF/OP CNSLTJ NEW/EST MOD 40: CPT | Performed by: STUDENT IN AN ORGANIZED HEALTH CARE EDUCATION/TRAINING PROGRAM

## 2025-01-29 NOTE — PATIENT INSTRUCTIONS
Lupus, Sjogren's Syndrome, Systemic Sclerosis    What is an HUGO?     HUGO stands for anti-nuclear antibody. An antibody is a molecule that is produced by the immune system and has the ability to incite inflammation in autoimmune disease. It has an association with conditions such as systemic lupus, Sjogren's, systemic sclerosis, inflammatory myositis, and mixed connective tissue disease.     The preferred method to measure in the antinuclear antibody is with indirect immunofluorescence. A patient's blood is added to pre-existing cells on a microscope slide. If the antibodies are present, they bind to the nuclei of the pre-existing cells on the microscope slide. Then, a fluorescent marker is added which binds to the antibody to allow the  to see the presence of the antibody under the microscope. If there is no fluorescence, the test is considered negative. If fluorescence is detected, the next step is to determine how much antibody is present. To do this, the technician performs a serial dilution on the patient's blood and repeats the test until there is no longer fluorescence. So, the more times the blood can be diluted and still see the fluorescence under the microscope, the higher the amount of antibody is in the blood. This is reported as a titer level, such as 1:1280. The higher the titer level, the more antibody is present.    The other method used to detect the antinuclear antibody is the enzyme linked immunoassay (SHERIN). This test uses an array of beads with markers that the antibodies can bind to. Unfortunately, this type of testing has the potential for false positive results because the method in which the test is performed can denature the testing material. Therefore, it is important to always perform an immunofluorescence test if the SHERIN test detects an antinuclear antibody.    At what point is an HUGO test considered to be positive?    According to the American College of [employer], a  "positive HUGO is considered to be a titer level of 1:80 or higher. Certain labs may report a titer of 1:40 as a \"positive\" test result by default. Up to 30% of healthy individuals can have a positive HUGO of 1:40, 15% of 1:80, 5% of 1:160, and 3% of 1:320. To put this into numbers, if 5% of the [place] population is HUGO positive, then 16 million individuals will have a positive HUGO. In contrast, the prevalence of lupus is only 1/1000 or 320,000 individuals with systemic lupus in the [place]. Thus, the number of patients with a positive HUGO without systemic lupus would far out number the number of patients with a positive HUGO and systemic lupus, 50:1.    In addition, healthy relatives of a lupus patient may also have a positive HUGO with low titer. As we age, such as above the age of 70 years old, we can also develop a low positive HUGO titer. Another important consideration is whether a patient has another type of autoimmune disease. Many patients with other autoimmune diseases, such as autoimmune thyroid disease, celiac disease, type 1 diabetes, multiple sclerosis, inflammatory bowel disease, etc, also have a positive HUGO. Lastly, medications can cause a patient to develop an HUGO. A few examples include medications such as procainamide, hydralazine, minocycline, diltiazem, and TNF inhibitors.    Do we recommend repeat or trend an HUGO level?    While there is no universal recommendation regarding this, we do know based on research that the level of HUGO detected does not correlate with the disease activity. So, if a patient is found to have a negative or low titer HUGO and the initial symptoms remain the same, there is no need to repeat an HUGO. If a patient develops a new symptom concerning for a rheumatologic condition, such as new onset Raynaud's, there may be a role in repeating the HUGO test. Similarly, once a patient has positive HUGO test, there is no need to repeat the test again unless circumstances change.     What is my " "risk of developing systemic lupus in the future?     In patients with a positive HUGO, approximately 5% of those patients will go on to develop systemic lupus in the future. Alternatively, a patient with a negative HUGO is unlikely to develop systemic lupus in the future.       Is it possible to have lupus with a negative HUGO?    As part of the criteria to diagnose systemic lupus, a patient must have an HUGO present at a titer of at least 1:80. Previously, old testing mechanisms used to detect an HUGO were not as sensitive as our current testing mechanisms. Historically patients who had symptoms of systemic lupus but did not have a detectable HUGO were considered to be \"HUGO negative\" lupus patients. However, these patients DID have detectable antibodies against other antigens, such as an SSA antibody. With the current methods of HUGO testing with immunofluorescence, it is unlikely that the test would produce a \"false negative,\" meaning that if the test is negative then there is no HUGO present to detect.     It is possible to have SKIN-limited lupus without having a positive HUGO. Examples of this include discoid lupus or subacute cutaneous lupus erythematosus among others.       "

## 2025-02-03 ENCOUNTER — PATIENT MESSAGE (OUTPATIENT)
Age: 55
End: 2025-02-03

## 2025-02-03 NOTE — PATIENT COMMUNICATION
Pt calling in to find out how many mg she should be taking of the tumeric with black pepper. She found one in the store that was 500 mg and wanted to make sure that was fine or if it was too much.        Please advise.

## 2025-02-04 LAB
CREAT ?TM UR-SCNC: 21.5 UMOL/L
MICROALBUMIN/CREAT UR: <14 MG/G{CREAT}

## 2025-02-10 ENCOUNTER — TELEPHONE (OUTPATIENT)
Age: 55
End: 2025-02-10

## 2025-02-10 NOTE — TELEPHONE ENCOUNTER
Pt states that she has disc with XR imaging provider requested and wanted the provider to review prior to her upcoming appt. She is wondering if there is any way she can send it or would she have to drop it off at the office

## 2025-02-10 NOTE — PROGRESS NOTES
Administrative Statements   Encounter provider Melva Godinez MD    The Patient is located at Home and in the following state in which I hold an active license [place].    The patient was identified by name and date of birth. Kasandra Burns was informed that this is a telemedicine visit and that the visit is being conducted through the Epic Embedded platform. She agrees to proceed..  My office door was closed. No one else was in the room.  She acknowledged consent and understanding of privacy and security of the video platform. The patient has agreed to participate and understands they can discontinue the visit at any time.    I have spent a total time of 20 minutes in caring for this patient on the day of the visit/encounter including Diagnostic results, Risks and benefits of tx options, Instructions for management, Patient and family education, Impressions, Counseling / Coordination of care, Documenting in the medical record, and Reviewing/placing orders in the medical record (including tests, medications, and/or procedures).    Rheumatology Follow-up Visit  Name: Kasandra Burns      : 1970      MRN: [id number]  Encounter Provider: Melva Godinez MD  Encounter Date: 2025   Encounter department: [facility] [address]  :  Assessment & Plan  HUGO positive  54-year-old female who presents for follow-up of positive HUGO 1: 160.  Her main symptoms from a rheumatologic standpoint include dry eye, dry mouth, vaginal dryness and joint pain.  Additional workup for serologies more specific for autoimmune disease were negative.  Discussed with patient that her symptoms could still be representative of Sjogren's syndrome as some patients may have negative antibody testing but positive salivary gland biopsy.  However, due to the associated cost of a biopsy we will defer doing this at this time.  Discussed that treatment of Sjogren's is primarily symptomatic as there are no FDA approved  treatments currently.  In regards to her joint pain, suspect this is more osteoarthritis in nature and not inflammatory.  We will arrange for follow-up in 6 months.       Sicca, unspecified type (HCC)         Primary osteoarthritis of both knees             History of Present Illness   HPI  Kasandra Burns is a 54 y.o. female who presents for follow-up.    Interval History:  Patient reports she is still having dryness symptoms.  Patient reports that she has noticed her fingers turning more red in the cold weather, but not necessarily white.  Fingers and toes are sensitive to cold.  Deferred secondary to video visit    Permanent History:  Presented in January 2025 for HUGO 1:160. She reported possible Raynaud's, chronic dry eye, dry mouth, vaginal dryness and worsening skin dryness, and joint pain.  X-ray of knees showed mild degenerative changes and x-ray of ankles showed calcaneal enthesophytes.  Laboratory workup showed negative RNP, Plasencia, SCL 70, dsDNA, Brittany 1, centromere, SSA, SSB normal complements, UA and urine protein to creatinine ratio normal.    Review of Systems  Complete ROS conducted as per HPI. In addition,   +dry eye, dry mouth, vaginal dryness, skin dryness    Objective   LMP 08/09/2016      Physical Exam  Deferred 2/2 video visit    Labs and Imaging  I have personally reviewed pertinent labs and imaging.

## 2025-02-11 NOTE — TELEPHONE ENCOUNTER
Called patient was unable to speak with patient, did leave a voice message. Advising patient she could bring her XR imaing either the report or CD so we could sent to the radiologist they could upload the imaging, prior to her office visit with provider and if she has any further questions she could call us back.

## 2025-02-12 ENCOUNTER — OFFICE VISIT (OUTPATIENT)
Dept: BARIATRICS | Facility: CLINIC | Age: 55
End: 2025-02-12
Payer: COMMERCIAL

## 2025-02-12 VITALS
OXYGEN SATURATION: 99 % | DIASTOLIC BLOOD PRESSURE: 80 MMHG | WEIGHT: 212.2 LBS | BODY MASS INDEX: 35.35 KG/M2 | HEART RATE: 67 BPM | HEIGHT: 65 IN | SYSTOLIC BLOOD PRESSURE: 142 MMHG

## 2025-02-12 DIAGNOSIS — E66.09 CLASS 2 OBESITY DUE TO EXCESS CALORIES WITHOUT SERIOUS COMORBIDITY WITH BODY MASS INDEX (BMI) OF 35.0 TO 35.9 IN ADULT: ICD-10-CM

## 2025-02-12 DIAGNOSIS — G47.33 OBSTRUCTIVE SLEEP APNEA: ICD-10-CM

## 2025-02-12 DIAGNOSIS — E03.9 HYPOTHYROIDISM, UNSPECIFIED TYPE: ICD-10-CM

## 2025-02-12 DIAGNOSIS — E66.812 CLASS 2 OBESITY DUE TO EXCESS CALORIES WITHOUT SERIOUS COMORBIDITY WITH BODY MASS INDEX (BMI) OF 35.0 TO 35.9 IN ADULT: ICD-10-CM

## 2025-02-12 DIAGNOSIS — R73.03 PRE-DIABETES: Primary | ICD-10-CM

## 2025-02-12 DIAGNOSIS — E55.9 VITAMIN D DEFICIENCY: ICD-10-CM

## 2025-02-12 DIAGNOSIS — E78.5 HYPERLIPIDEMIA, UNSPECIFIED HYPERLIPIDEMIA TYPE: ICD-10-CM

## 2025-02-12 PROCEDURE — 99204 OFFICE O/P NEW MOD 45 MIN: CPT | Performed by: INTERNAL MEDICINE

## 2025-02-12 NOTE — ASSESSMENT & PLAN NOTE
"-  Main drivers of patients elevated body weight -  genetics, excess calories, cravings, emotional eating, snacking/grazing, mindless/boredom eating, inadequate protein leading to poor satiety, insulin resistance, menopause, obstructive sleep apnea( untreated), and lower physical activity    Antiobesity Medications/Medical --class II obesity BMI of 35.31  Labs ordered to evaluate for comorbidities  Patient reports she checked with her insurance and they do not cover any weight loss medications  Patient finds the Zepbound vial option or the savings coupon option for GLP-1 injectable medications as financially not feasible to sustain in the long-term  oral medication options were reviewed:  Patient is currently on Strattera which would contraindicate the use of phentermine  Patient states that she had a reaction to Wellbutrin in the past where she \"completely lacked empathy\" she reports that the medication changed her personality particularly when she had 3 deaths in the family.  No contraindications to Topamax naltrexone or metformin  Once labs are reviewed we will submit a prescription for Topamax      Nutrition:    Patient would like to work with the dietitian in the healthy core program to enhance her nutritional knowledge and help balance her nutrition   discussed reducing some processed foods and starting the day with a protein rich breakfast      Physical Activity:   Encourage patient to resume gym workouts 2 days a week for strength training  She works in a [employer] and gets at least 10,000 steps in per her report  Sleep: -   prior history of moderate sleep apnea currently not on CPAP  Family members have noted her snoring and she feels tired through the day  Will recheck sleep study to evaluate for SAMIR  Aim for 7+ hours of sleep    Food Behaviors/Stress:   Some snacking grazing emotional eating which happen anytime during the day or evening which would be addressed by appropriate whole food choices promoting " satiety

## 2025-02-12 NOTE — PROGRESS NOTES
"  Assessment/Plan     Kasandra Burns is 54 y.o. year old female  who comes in for consultation for assistance with weight management.     - Discussed options of HealthyCORE-Intensive Lifestyle Intervention Program, Very Low Calorie Diet-VLCD, and Conservative Program and the role of weight loss medications.  - Patient is interested in pursuing HealthyCORE-Intensive Lifestyle Intervention Program        Class 2 obesity without serious comorbidity with body mass index (BMI) of 35.0 to 35.9 in adult  -  Main drivers of patients elevated body weight -  genetics, excess calories, cravings, emotional eating, snacking/grazing, mindless/boredom eating, inadequate protein leading to poor satiety, insulin resistance, menopause, obstructive sleep apnea( untreated), and lower physical activity    Antiobesity Medications/Medical --class II obesity BMI of 35.31  Labs ordered to evaluate for comorbidities  Patient reports she checked with her insurance and they do not cover any weight loss medications  Patient finds the Zepbound vial option or the savings coupon option for GLP-1 injectable medications as financially not feasible to sustain in the long-term  oral medication options were reviewed:  Patient is currently on Strattera which would contraindicate the use of phentermine  Patient states that she had a reaction to Wellbutrin in the past where she \"completely lacked empathy\" she reports that the medication changed her personality particularly when she had 3 deaths in the family.  No contraindications to Topamax naltrexone or metformin  Once labs are reviewed we will submit a prescription for Topamax      Nutrition:    Patient would like to work with the dietitian in the healthy core program to enhance her nutritional knowledge and help balance her nutrition   discussed reducing some processed foods and starting the day with a protein rich breakfast      Physical Activity:   Encourage patient to resume gym workouts 2 days " a week for strength training  She works in a [employer] and gets at least 10,000 steps in per her report  Sleep: -   prior history of moderate sleep apnea currently not on CPAP  Family members have noted her snoring and she feels tired through the day  Will recheck sleep study to evaluate for SAMIR  Aim for 7+ hours of sleep    Food Behaviors/Stress:   Some snacking grazing emotional eating which happen anytime during the day or evening which would be addressed by appropriate whole food choices promoting satiety       Kasandra was seen today for consult.    Diagnoses and all orders for this visit:    Pre-diabetes  -     Hemoglobin A1C; Future  -     Insulin, fasting; Future  -     Comprehensive metabolic panel; Future  -     CBC and differential; Future  -     Comprehensive metabolic panel  -     CBC and differential    BMI 37.0-37.9, adult  -     Ambulatory Referral to Weight Management    Hyperlipidemia, unspecified hyperlipidemia type  -     Lipid panel; Future  -     Lipid panel    Hypothyroidism, unspecified type  -     T3, free; Future  -     TSH, 3rd generation with Free T4 reflex; Future  -     T3, free  -     TSH, 3rd generation with Free T4 reflex    Vitamin D deficiency  -     Vitamin D 25 hydroxy; Future  -     Vitamin D 25 hydroxy    Obstructive sleep apnea  -     Ambulatory referral to Sleep Medicine; Future    Class 2 obesity due to excess calories without serious comorbidity with body mass index (BMI) of 35.0 to 35.9 in adult          -In addition, please follow general recommendations below.          Return visit:  6-8 weeks        General Lifestyle recommendations:    Nutrition   -Avoid skipping meals. Avoid sugary beverages. At least 64oz of water daily.  Limit processed food, refined sugars and grain. Encourage  healthy choices for meals and snacks   -Focus on protein goals and non starchy fiber rich vegetables for satiety effect and to help support a calorie deficit.   - Emphasize portion control,  "well balanced macronutrient's (protein, carbohydrate, fat using MyPlate method )and adequate protein with each meal/snacks and distributing calories equally throughout the day along with.   -Advise starting the day with a protein breakfast   Behavioral/Stress   Food log via briana or provided paper log (briana options include www.[employer].com, sparkpeople.com, loseit.com, calorieking.com, Nordicplan). Encouraged mindful eating. Be sure to set aside time to eat, eat slowly, and savor your food. Consider meditation apps and/or taking a few minutes of mindfulness every AM. Understand the role of regarding the role of stress hormone cortisol in promoting weight gain and visceral fat accumulation. Weigh daily or atleast 2-3 times/ week  Physical Activity   Increase physical activity by 10 minutes daily. Gradually increase physical activity to a goal of 5 days per week for 30 minutes of MODERATE intensity ( should be able to pass the \"talk test\" but should not be able to sing. Target 150-300 minutes  PLUS 2 days per week of FULL BODY resistance training. Progression will be addressed at follow up visits. Encouraged contemplation regarding establishing a daily physical activity routine  - Resistance training along with increase protein intake is important to maintain and enhance metabolism  Sleep   Encourage sleep hygiene and importance of having adequate sleep duration at least > 6 hours to support response in weight loss efforts    Handouts provided :  [facility] program [facility]  MyPlate and food quality  Food log resources, phone briana or paper journal  Antiobesity medications options     - Discussed at length and the role of weight loss medications and medication options   - Explained the importance of making lifestyle changes in addition to starting any anti-obesity medications if the  patient chooses.  -  Initial weight loss goal of 5-10% weight loss for improved health  - Weight loss can improve patient's " co-morbid conditions and/or prevent weight-related complications.  - Weight is not at goal and patient has been unable to achieve a meaningful weight loss above 5% using various programs and tools for more than 6 months    Kasandra was seen today for consult.    Diagnoses and all orders for this visit:    Pre-diabetes  -     Hemoglobin A1C; Future  -     Insulin, fasting; Future  -     Comprehensive metabolic panel; Future  -     CBC and differential; Future  -     Comprehensive metabolic panel  -     CBC and differential    BMI 37.0-37.9, adult  -     Ambulatory Referral to Weight Management    Hyperlipidemia, unspecified hyperlipidemia type  -     Lipid panel; Future  -     Lipid panel    Hypothyroidism, unspecified type  -     T3, free; Future  -     TSH, 3rd generation with Free T4 reflex; Future  -     T3, free  -     TSH, 3rd generation with Free T4 reflex    Vitamin D deficiency  -     Vitamin D 25 hydroxy; Future  -     Vitamin D 25 hydroxy    Obstructive sleep apnea  -     Ambulatory referral to Sleep Medicine; Future    Class 2 obesity due to excess calories without serious comorbidity with body mass index (BMI) of 35.0 to 35.9 in adult                      Total time spent reviewing chart, interviewing patient, examining patient, discussing plan, answering all questions, and documentin min, with >50% face-to-face time spent counseling patient on nonsurgical interventions for the treatment of excess weight. Discussed in detail nonsurgical options including intensive lifestyle intervention program, very low-calorie diet program and conservative program.  Discussed the role of weight loss medications.  Counseled patient on diet behavior and exercise modification for weight loss.        History of present illness/ Weight history       Onset--patient reports that she has struggled with her weight most of her life since her [place]'s.  She has noted a drastic increase in weight most recently over the last  2 to 3 years.  She is unable to pinpoint the exact reason.  She states that she underwent menopause at age 38.  2 years ago she moved into a new home and since then she has been gaining weight and also had a recurrence of menopausal symptoms as hot hot flashes and night sweats.  She sought out a hormone specialist and has been on estradiol and progesterone.    She reports her stable weight has been 186-192 in her 40s age 38 MP  She reports that she did the healthy core program with some meal replacements in 2015.  She states that in the [place] location with [facility]  She reports she was 215 pounds at her heaviest and went down to 170 pounds which she kept off for several years  She has been referred by her OB/GYN    Wt Readings from Last 30 Encounters:   02/12/25 96.3 kg (212 lb 3.2 oz)   01/29/25 97.1 kg (214 lb)   01/27/25 95.7 kg (211 lb)   01/14/25 99.3 kg (219 lb)   12/27/24 99.3 kg (219 lb)   12/26/24 98.4 kg (217 lb)   11/04/24 97.1 kg (214 lb)   09/13/24 97.1 kg (214 lb)   06/26/24 95.3 kg (210 lb)   05/09/24 95.3 kg (210 lb)   07/21/23 97.1 kg (214 lb)   06/06/23 97.1 kg (214 lb)   05/19/23 96.2 kg (212 lb)   05/09/23 96.2 kg (212 lb)   07/14/20 89.4 kg (197 lb 3.2 oz)   06/27/20 87.5 kg (193 lb)   06/22/20 89.5 kg (197 lb 6.4 oz)   08/06/19 81.6 kg (179 lb 12.8 oz)   07/11/19 81.6 kg (179 lb 12.8 oz)   06/26/19 81.9 kg (180 lb 9.6 oz)   06/24/19 80.8 kg (178 lb 3.2 oz)   06/05/19 82.6 kg (182 lb)   05/30/19 81.6 kg (180 lb)   04/23/19 82.1 kg (181 lb)   04/08/19 82.1 kg (181 lb)   08/18/18 82.1 kg (181 lb)   06/28/18 82.1 kg (181 lb)   04/23/18 80.8 kg (178 lb 3.2 oz)   04/20/18 80.7 kg (178 lb)   11/13/17 79.4 kg (175 lb)    BMI Readings from Last 30 Encounters:   02/12/25 35.31 kg/m²   01/29/25 36.73 kg/m²   01/27/25 36.22 kg/m²   01/14/25 37.59 kg/m²   12/27/24 37.59 kg/m²   12/26/24 37.25 kg/m²   11/04/24 36.73 kg/m²   09/13/24 36.73 kg/m²   06/26/24 36.05 kg/m²   05/09/24 36.05 kg/m²   07/21/23  36.73 kg/m²   06/06/23 36.73 kg/m²   05/19/23 36.39 kg/m²   05/09/23 36.39 kg/m²   07/14/20 33.85 kg/m²   06/27/20 33.13 kg/m²   06/22/20 33.88 kg/m²   08/06/19 30.86 kg/m²   07/11/19 30.86 kg/m²   06/26/19 31.00 kg/m²   06/24/19 30.59 kg/m²   06/05/19 31.24 kg/m²   05/30/19 30.90 kg/m²   04/23/19 31.07 kg/m²   04/08/19 30.41 kg/m²   08/18/18 30.12 kg/m²   06/28/18 30.12 kg/m²   04/23/18 29.65 kg/m²   04/20/18 29.62 kg/m²   11/13/17 29.12 kg/m²                   Lifestyle questionnaire   Weight History  Highest adult weight:: (Patient-Rptd) (P) 219 lbs  Lowest adult weight:: (Patient-Rptd) (P) 135 lbs  What is your goal weight?: (Patient-Rptd) (P) 145 lbs  How long have you struggled with maintaining a healthy weight?: (Patient-Rptd) (P) Adolescence  What weight loss attempts have you had in the past?: (Patient-Rptd) (P) Diet, Exercise, Commercial Programs (Weight Watchers, Dina Stone, Etc), Prescription Medications  Which commercial programs have you tried?: (Patient-Rptd) (P) Dina estephania  Which prescription medications have you tried?: (Patient-Rptd) (P) Phentermine    Food Behaviors  Do you have any of the following food related behaviors?: (Patient-Rptd) (P) Emotional Eating, Boredom Eating, Cravings  Is there a trouble area of the day when these behaviors are more prominent?: (Patient-Rptd) (P) No    Food History  Provide the time that you typically eat and common foods you eat for each meal/snack: : (Patient-Rptd) (P) Breakfast, Lunch, Dinner  Provide the time and common foods you eat for breakfast:: (Patient-Rptd) (P) 6am tastycake with coffee and then cereal or toast with peanut butter  Provide the time and common foods you eat for lunch:: (Patient-Rptd) (P) 12pm yogurt or sandwich egg salad with chips  Provide the time and common foods you eat for : (Patient-Rptd) (P) 5pm chicken, potatoes  How often do you go out to eat or have take out?: (Patient-Rptd) (P) 2 times a week  Daily beverage intake, please  select the beverages you consume daily and the amount(s):: (Patient-Rptd) (P) Water, Soda, Coffee  How many cups of water?: (Patient-Rptd) (P) 32  How many cups of soda?: (Patient-Rptd) (P) 16  How many cups of coffee?: (Patient-Rptd) (P) 4  Do you consume alcohol?: (Patient-Rptd) (P) No    Physical Activity   How often do you exercise?: (Patient-Rptd) (P) Work in [employer], havent worked out in [employer] other then work  How long are your activity sessions?: (Patient-Rptd) (P) 1 hour if i go to the gym  What types of physical activity are you currently participating in?: (Patient-Rptd) (P) Strength Training, Cardio (elliptical, walking, running, biking, rowing, etc)    Sleep  How many hours of sleep are you getting per night?: (Patient-Rptd) (P) 6  How would you rate your quality of sleep?: (Patient-Rptd) (P) Fair  Do you have a history of sleep apnea?: (Patient-Rptd) (P) Yes  Is this being treated?: (Patient-Rptd) (P) No    Family/Social History  Do you have a family history of obesity?: (Patient-Rptd) (P) Yes  Who in your family?: (Patient-Rptd) (P) Mother    Gynecological History  If of childbearing age, are you using birth control?: (Patient-Rptd) (P) N/A  Menopausal status?: (Patient-Rptd) (P) Postmenopause (greater than or equal to 1 year without menstrual cycles)       Social History     Substance and Sexual Activity   Alcohol Use Not Currently      Social History     Tobacco Use   Smoking Status Former    Current packs/day: 0.00    Types: Cigarettes    Quit date: 2004    Years since quittin.1   Smokeless Tobacco Never   Tobacco Comments    never smoker (as per allscripts)      Social History     Substance and Sexual Activity   Drug Use Never             Occupation-works in a [employer]     Psycho social- lives with her mom          Start date: 2025   Intial weight on 2025 : 96.3 kg (212 lb 3.2 oz)    on 2025 :Body mass index is 35.31 kg/m².  Obesity Class: 35.0-39.9- Obesity Class  II  Goal weight: 145 lbs    Waist circumference (inches): 38 Inches    Colonoscopy: 01/27/2025   Mammogram: 11/04/2024    Medication considerations/contraindications     -Patient denies personal history of pancreatitis. Patient also denies personal and family history of medullary thyroid cancer, and multiple endocrine neoplasia type 2 (MEN 2 tumor). -Patient denies any history of kidney stones, seizures, or glaucoma, diabetic retinopathy, gall bladder disease, gastroparesis, hyperthyroidism.  -Denies Hx of CAD, PAD, palpitations, arrhythmia, uncontrolled hypertension  -Denies uncontrolled anxiety or depression, suicidal behavior or thinking , insomnia or sleep disturbance.         Past medical history/past surgical history       Previous notes and records have been reviewed.    The following portions of the patient's history were reviewed and updated as appropriate: allergies, current medications, past family history, past medical history, past social history, past surgical history, and problem list.    Past Medical History:   Diagnosis Date    Anemia     Asthma     last assessed: 7/10/2014    Deep vein thrombosis (HCC)     Depression 2/2020    lost job    GERD (gastroesophageal reflux disease)     Migraines     Seasonal allergies     last assessed: 7/10/2014    Sleep apnea     Urinary incontinence     Varicose vein of leg          Past Surgical History:   Procedure Laterality Date    CYSTOSCOPY N/A 11/13/2017    Procedure: CYSTOSCOPY;  Surgeon: Delroy Negro MD;  [facility]: [facility];  Service: Urology    ESOPHAGOGASTRODUODENOSCOPY      NE SLING OPERATION STRESS INCONTINENCE N/A 11/13/2017    Procedure: TRANSVAGINAL OBTURATOR BLADDER SLING;  Surgeon: Delroy Negro MD;  Location[facility];  Service: Urology    NE STAB PHLEBT VARICOSE VEINS 1 XTR 10-20 STAB INCS Right 06/17/2016    Procedure: STAB PHLEBECTOMY OF LEG;  Surgeon: Adolph Ceballos MD;  Location: [facility];  Service: Vascular    TONSILLECTOMY       "TUBAL LIGATION Bilateral     VARICOSE VEIN SURGERY Bilateral 1988    stripping and other procedures    WISDOM TOOTH EXTRACTION               Family History   Problem Relation Age of Onset    Bipolar disorder Mother     Anxiety disorder Mother         and depression    Hypertension Mother     Hypothyroidism Mother     Depression Mother         and anxiety    Migraines Mother     COPD Father     Peripheral vascular disease Father     Coronary artery disease Father     Diabetes Father             Emphysema Father     Anxiety disorder Father         and depression    Depression Father         and anxiety    Arthritis Father     Other Father         cardiac disorder    Hypertension Father             Cancer Father     Heart disease Father     Lung disease Father     No Known Problems Daughter     No Known Problems Daughter     Hypertension Brother     Hypertension Brother     Eczema Brother     Cancer Maternal Aunt     Deep vein thrombosis Maternal Aunt     Breast cancer Maternal Aunt 65    Dementia Maternal Aunt     Uterine cancer Maternal Aunt             Objective     /80   Pulse 67   Ht 5' 5\" (1.651 m)   Wt 96.3 kg (212 lb 3.2 oz)   LMP 2016   SpO2 99%   BMI 35.31 kg/m²     Review of Systems   Constitutional:  Negative for chills, fatigue and fever.   HENT:  Negative for ear pain and sore throat.    Eyes:  Negative for pain and visual disturbance.   Respiratory:  Negative for cough and shortness of breath.    Cardiovascular:  Negative for chest pain, palpitations and leg swelling.   Gastrointestinal:  Negative for abdominal pain, constipation, diarrhea, nausea and vomiting.   Genitourinary:  Negative for dysuria and hematuria.   Musculoskeletal:  Negative for arthralgias and back pain.   Skin:  Negative for color change and rash.   Neurological:  Negative for seizures, syncope and headaches.   Psychiatric/Behavioral:  Negative for dysphoric mood. The patient is not nervous/anxious.  "   All other systems reviewed and are negative.    Physical Exam  Vitals and nursing note reviewed.   Constitutional:       Appearance: Normal appearance.   HENT:      Head: Normocephalic.   Pulmonary:      Effort: Pulmonary effort is normal.   Neurological:      General: No focal deficit present.      Mental Status: He is alert and oriented to person, place, and time.   Psychiatric:         Mood and Affect: Mood normal.         Behavior: Behavior normal.         Thought Content: Thought content normal.         Judgment: Judgment normal.         Medications       Current Outpatient Medications:     ALPRAZolam (XANAX) 0.25 mg tablet, TAKE 1 TABLET BY MOUTH DAILY AT BEDTIME AS NEEDED FOR ANXIETY, Disp: 20 tablet, Rfl: 0    atoMOXetine (STRATTERA) 25 mg capsule, Take 25 mg by mouth 2 (two) times a day, Disp: , Rfl:     cetirizine (ZyrTEC) 10 mg tablet, Take 10 mg by mouth daily as needed.  , Disp: , Rfl:     Cholecalciferol (VITAMIN D3) 2000 units TABS, Take 2,000 Units by mouth daily, Disp: , Rfl:     cyanocobalamin (VITAMIN B-12) 1,000 mcg tablet, Take by mouth daily, Disp: , Rfl:     estradiol (Climara) 0.05 mg/24 hr, Place 1 patch on the skin over 7 days once a week, Disp: 4 patch, Rfl: 3    estradiol (Climara) 0.1 mg/24 hr, Place 1 patch on the skin over 7 days once a week, Disp: 4 patch, Rfl: 3    famotidine (PEPCID) 20 mg tablet, Take 20 mg by mouth daily., Disp: , Rfl:     Multiple Vitamins-Minerals (ONE-A-DAY WOMENS 50+ ADVANTAGE) TABS, Take by mouth, Disp: , Rfl:     Progesterone 100 MG CAPS, Take 1 capsule by mouth daily, Disp: 30 capsule, Rfl: 3    trospium chloride (SANCTURA) 20 mg tablet, Take 1 tablet (20 mg total) by mouth 2 (two) times a day, Disp: 60 tablet, Rfl: 6    Wheat Dextrin (BENEFIBER PO), Take by mouth, Disp: , Rfl:     estradiol (ESTRACE VAGINAL) 0.1 mg/g vaginal cream, Insert 0.5 g into the vagina 2 (two) times a week, Disp: 42.5 g, Rfl: 1    Myrbetriq 25 MG TB24, Take 25 mg by mouth daily,  Disp: , Rfl:     neomycin-polymyxin-dexamethasone (MAXITROL) ophthalmic suspension, INSTILL 1 TO 2 DROPS TO AFFECTED EYES EVERY 4 HOURS FOR 5 DAYS, Disp: , Rfl:     nitrofurantoin (MACROBID) 100 mg capsule, Take 100 mg by mouth 2 (two) times a day, Disp: , Rfl:            Labs and imaging     Recent labs and imaging have been personally reviewed.  Lab Results   Component Value Date    WBC 3.63 (L) 04/29/2019    HGB 13.9 04/29/2019    HCT 45.0 04/29/2019     (H) 04/29/2019     04/29/2019     Lab Results   Component Value Date     05/02/2015    SODIUM 141 10/01/2019    K 4.0 10/01/2019     10/01/2019    CO2 28 10/01/2019    ANIONGAP 8 05/02/2015    AGAP 8 10/01/2019    BUN 14 10/01/2019    CREATININE 0.90 10/01/2019    GLUF 98 10/01/2019    CALCIUM 8.5 10/01/2019    AST 16 10/01/2019    ALT 19 10/01/2019    ALKPHOS 46 10/01/2019    PROT 6.7 05/02/2015    TP 6.4 10/01/2019    BILITOT 0.8 05/02/2015    TBILI 0.90 10/01/2019    EGFR 75 10/01/2019     Lab Results   Component Value Date    HGBA1C 5.7 10/01/2019     Lab Results   Component Value Date    BKN0YATCBCSV 1.362 04/29/2019     Lab Results   Component Value Date    CHOLESTEROL 146 04/29/2019     Lab Results   Component Value Date    HDL 63 (H) 04/29/2019     Lab Results   Component Value Date    TRIG 40 04/29/2019     Lab Results   Component Value Date    LDLCALC 75 04/29/2019     Vit D, 25-Hydroxy   Date Value Ref Range Status   04/29/2019 36.2 30.0 - 100.0 ng/mL Final   07/25/2014 23.3 (L) 30.0 - 100.0 ng/mL Final     Comment:     Deficiency <20ng/ml  Insufficiency 20-30ng/ml  Sufficient  ng/ml  *Patients undergoing fluorescein dye angiography may retain small  amounts of fluorescein in the body for 48-72 hours post procedure.  Samples containing fluorescein can produce falsely elevated Vitamin D  values. If the patient had this procedure, a specimen should be  resubmitted post fluorescein clearance.  The above 1 analytes were  performed by [facility]  [address] [place] [address],[place] [address]       INSULIN   Date Value Ref Range Status   05/02/2015 8.5 2.6 - 24.9 uIU/mL Final     Comment:     Performed at:  RN - [facility] [address]  [id number]  : Araceli B Reyes MD, Phone:  [phone]  The above 1 analytes were performed by [facility]  Account # [id number],[address] [place] [id number]

## 2025-02-14 ENCOUNTER — TRANSCRIBE ORDERS (OUTPATIENT)
Dept: SLEEP CENTER | Facility: CLINIC | Age: 55
End: 2025-02-14

## 2025-02-14 ENCOUNTER — TELEPHONE (OUTPATIENT)
Age: 55
End: 2025-02-14

## 2025-02-14 DIAGNOSIS — G47.33 OSA (OBSTRUCTIVE SLEEP APNEA): Primary | ICD-10-CM

## 2025-02-14 NOTE — TELEPHONE ENCOUNTER
Patient called in asks if her lab results were received yet she said PCP faxed she had done at [facility].  Not seeing anything scanned in please advise if these were receive and if not patient asks that we call [facility] she has VV on Tues.

## 2025-02-14 NOTE — TELEPHONE ENCOUNTER
Called patient and to advise her I found her Lab results they are scanned in media as well I attached them to the encounter. She states she is on her way to drop off her XRAY as well.

## 2025-02-18 ENCOUNTER — TELEMEDICINE (OUTPATIENT)
Age: 55
End: 2025-02-18
Payer: COMMERCIAL

## 2025-02-18 DIAGNOSIS — M35.00 SICCA, UNSPECIFIED TYPE (HCC): ICD-10-CM

## 2025-02-18 DIAGNOSIS — M17.0 PRIMARY OSTEOARTHRITIS OF BOTH KNEES: ICD-10-CM

## 2025-02-18 DIAGNOSIS — R76.8 ANA POSITIVE: Primary | ICD-10-CM

## 2025-02-18 PROCEDURE — 99214 OFFICE O/P EST MOD 30 MIN: CPT | Performed by: STUDENT IN AN ORGANIZED HEALTH CARE EDUCATION/TRAINING PROGRAM

## 2025-02-18 NOTE — LETTER
For treatment of dry eyes:  - Use preservative-free eye drops throughout the day (Refresh, Systane)  - Eye lubricants/ointments can be helpful to apply before sleep   - Supplement with Omega-3 fatty acids  - Talk to your eye doctor about prescription drops such as Restasis   - Keep a humidified environment  - Glasses/goggles can be useful to trap in moisture around the eye, especially if going outside in a dry or windy environment     For treatment of dry mouth:   - Use sugar-free (NOT sugarless) xylitol-containing gum or lozenges. Avoid sugar-free options that contain aspartame as they can be drying.   - Use oral mouth rinse designed for dry mouth (Biotene, Act)  - Use Dr. Blanco's Herbal Lollipops or Loloz, especially those that contain licorice root extract (helps prevent cavities) UNLESS licorice interacts with your other medications  - XyliMelts are beneficial for nighttime moisture (place a disc on the gum above the upper molars)  - Massage the salivary glands  - Drink green tea in place of other caffeine beverages  - Do oil pulling with coconut oil (take 1/2 teaspoon of solid virgin coconut oil, place in mouth, swish around between teeth for about 90 seconds)   - Massage the gums with a soft toothbrush (Curaprox and Plumasoft are good examples) or Waterpik without water or toothpaste to stimulate salivary flow   - Stay hydrated, but avoid drinking excessive water (water does not lubricate the mouth and saliva is swallowed every time you swallow water). Ice chips are a good alternative

## 2025-02-18 NOTE — PROGRESS NOTES
Called patient, was unable to speak with her. Did leave a voice message advising patient we are trying to contact her to arrange a 6 month follow up appointment, as well let the patient know to call us back to make that appointment at the [address] [place] [address] location.

## 2025-03-03 ENCOUNTER — TELEPHONE (OUTPATIENT)
Age: 55
End: 2025-03-03

## 2025-03-03 NOTE — TELEPHONE ENCOUNTER
Patient called asking to speak with you, however, then explained that she would like to do the body stats/metabolic and comp tests at the time of her appointment on Thursday 3/6/25. Made a warm transfer of the call to Koki in the [place] office.

## 2025-03-16 DIAGNOSIS — Z79.890 HORMONE REPLACEMENT THERAPY: ICD-10-CM

## 2025-03-17 RX ORDER — ESTRADIOL 0.05 MG/D
PATCH TRANSDERMAL
Qty: 4 PATCH | Refills: 3 | Status: SHIPPED | OUTPATIENT
Start: 2025-03-17

## 2025-03-24 ENCOUNTER — CLINICAL SUPPORT (OUTPATIENT)
Dept: BARIATRICS | Facility: CLINIC | Age: 55
End: 2025-03-24

## 2025-03-24 VITALS — BODY MASS INDEX: 35.67 KG/M2 | WEIGHT: 214.1 LBS | HEIGHT: 65 IN

## 2025-03-24 DIAGNOSIS — R63.5 ABNORMAL WEIGHT GAIN: Primary | ICD-10-CM

## 2025-03-24 PROCEDURE — RECHECK

## 2025-03-24 PROCEDURE — BODSTAT PR BODY STAT

## 2025-03-24 NOTE — PROGRESS NOTES
"Weight Management Medical Nutrition Assessment  Kasandra is here today for body stats package. Current weight 214.1#. Patient has struggled with her weight for most of her life. Reports more significant increase over the last 2-3 years. Per dietary recall, patient's intake is high in carbs, fats, and processed foods. Would benefit from increased protein and fiber at meals and snacks. Low-calorie meal plan reviewed. Body composition completed utilizing SECA scale and results reviewed with patient. REE testing completed today and results reviewed with patient. Reevue indirect calorimeter revealed REE is WNL (+9%) compared to the predictive normal for someone of same age, height, and gender. Recommend calorie range of 1,500-1,700 kcal/day depending on activity level based on indirect calorimeter reading. Recommend patient begin logging via digital food log. Likely exceeding deficit via sugar sweetened beverages, calorie dense foods, and stress and emotionally motivated grazing. Reviewed meeting with MW LCSW that is available and patient will consider. Currently patient has no structured activity in her current routine. Has gym membership and is aware of the benefits of strength and resistance training on lean body mass and the benefits of aerobic activity on overall health. Reviewed eventual activity goal of 150-300 minutes of aerobic activity with 2-3 days of full body strength and resistance training per week but encouraged patient to start slowly to build a sustainable routine. RD card provided and options for follow up reviewed.     Likes: eggs, Greek yogurt, cheese, cottage cheese, nuts, peanut butter, hummus     Patient seen by Medical Provider in past 6 months:  yes  Requested to schedule appointment with Medical Provider: No    Anthropometric Measurements  Provider Start Weight (#): 212.2# (25)  Current Weight (#): 214.1#  TBW % Change from start weight: --%  IBW: 125# (65\")  Goal Weight (#): ST# " "LT#  Highest Weight (#): 219#  Stable Adult Weight (#): 186-192# (15 years ago)    Weight Loss History  Previous weight loss attempts: Commercial Programs (Weight Watchers, [employer]-[place], etc.)  Exercise, Self Created Diets (Portion Control, Healthy Food Choices, etc.)  Prescription Medications     Food and Nutrition Related History  Wake up: 4 am   Bed Time: 9-11 pm   Sleep quality: fair - 5-6 hours, wakes often     Dietary Recall  Breakfast (6 am): tasty cake w/ coffee and then cereal or toast w/ PB Or 2 eggs w/ bread or as a sandwich Or yesterday was --   Snack (9 am): danon light + fit w/ fruit   Lunch (12 pm): yogurt w/ granola + trail mix Or sandwich Or egg salad w/ chips Or lean cuisine meal Or yesterday was Indian bread pizza w/ chips + regular soda  Snack: stress eating/grazing - cookie Or cheese and crackers Or \"not good for you stuff\" (may have coffee in afternoon as well)  Dinner (5 pm): chicken w/ potatoes Or may skip dinner entirely (few days/week) Or yesterday was dining out   Snack: --    Beverages: water, 16 oz soda, 16-20 oz coffee (splash of milk + 1 tsp sugar)   Volume of beverage intake: 20-32 oz water    Weekends: \"I eat a lot less\"  Cravings: sugar/sweets   Trouble area of day: afternoon     Frequency of Eating out: cut back in January, 2x/week   Food restrictions: mushroom allergy  Cooking: self  Food Shopping: self     Occupation: Warehouse (on feet at work)    Physical Activity  Activity: No formal routine (has [employer] membership, weights, bands, and exercise ball at home)  Frequency: --  Physical limitations/barriers to exercise: previous knee injury     Estimated Needs  [facility] Energy Needs (needs at 214#)  BMR: 1,567 kcal  Maintenance calories (sedentary): 1,880 kcal  1-2#/week loss (sedentary): 1,130-1,380 kcal  1-2#/week loss (light activity): 1,154-1,654 kcal    Protein: 68-85 grams (1.2-1.5g/kg IBW)  Total Fluid: 102 ounces ([place]-Segar Method) [#]  Free " Fluid: 81 ounces (Chris-Raven Method - 20%)    Nutrition Diagnosis  Yes;    Overweight/obesity related to Excess energy intake as evidenced by BMI more than normative standard for age and sex (obesity-grade II 35-39.9)     Nutrition Intervention    Nutrition Prescription  Calories: 1,500-1,700 kcal  Protein: 70-85 g  Fluid: 81 ounces    Breakfast: 300-400/15-20  Snack: 100-150/5+  Lunch: 400-500/25-30  Snack: 100-150/5+  Dinner: 500/25-30  Snack: --    Nutrition Education  Calorie controlled menu  Lean protein food choices  Healthy snack options  Appropriate portions  Appropriate meal spacing    Nutrition Counseling  Strategies: motivational interviewing, goal setting, personalized meal planning    Monitoring and Evaluation:    Evaluation criteria  Energy and protein intake  Fluid intake  Monitor weekly weight  Meal planning/preparation  Calorie tracking/Measuring  Portion control   Physical activity     Barriers to change:none  Readiness to change: Preparation:  (Getting ready to change)   Comprehension: good  Expected Compliance: good

## 2025-05-12 ENCOUNTER — TELEPHONE (OUTPATIENT)
Age: 55
End: 2025-05-12

## 2025-05-12 NOTE — TELEPHONE ENCOUNTER
PT put an order in for some shakes and bars yesterday and wants to make sure it will be ready for  today and was interested in any free samples we may have.  I checked with Koki in the office her order is ready today and when she comes in to pick it up she should mention about the free samples since it depends on what she is looking for and Koki can see what they have available

## (undated) DEVICE — TRAY FOLEY 16FR URIMETER SILICONE SURESTEP

## (undated) DEVICE — LUBRICANT SURGILUBE TUBE 4 OZ  FLIP TOP

## (undated) DEVICE — CONMED ACCESSORY ELECTRODE, FLAT BLADE WITH EXTENDED INSULATION: Brand: CONMED

## (undated) DEVICE — SUT VICRYL 2-0 SH 27 IN UNDYED J417H

## (undated) DEVICE — SPONGE PVP SCRUB WING STERILE

## (undated) DEVICE — ADHESIVE SKN CLSR HISTOACRYL FLEX 0.5ML LF

## (undated) DEVICE — MAYO STAND COVER: Brand: CONVERTORS

## (undated) DEVICE — MEDI-VAC YANK SUCT HNDL W/TPRD BULBOUS TIP: Brand: CARDINAL HEALTH

## (undated) DEVICE — 3M™ IOBAN™ 2 ANTIMICROBIAL INCISE DRAPE 6650EZ: Brand: IOBAN™ 2

## (undated) DEVICE — CURITY PLAIN PACKING STRIP: Brand: CURITY

## (undated) DEVICE — CYSTOSCOPY PACK: Brand: CONVERTORS

## (undated) DEVICE — SUT MONOCRYL 4-0 PS-2 27 IN Y426H

## (undated) DEVICE — STRL UNIVERSAL MINOR VAGINAL: Brand: CARDINAL HEALTH

## (undated) DEVICE — PLUMEPEN PRO 10FT

## (undated) DEVICE — SUT SILK 0 FSL 18 IN 678G

## (undated) DEVICE — NEEDLE 25G X 1 1/2

## (undated) DEVICE — 3000CC GUARDIAN II: Brand: GUARDIAN

## (undated) DEVICE — SKIN MARKER DUAL TIP WITH RULER CAP, FLEXIBLE RULER AND LABELS: Brand: DEVON

## (undated) DEVICE — DISPOSABLE EQUIPMENT COVER: Brand: SMALL TOWEL DRAPE

## (undated) DEVICE — 1820 FOAM BLOCK NEEDLE COUNTER: Brand: DEVON

## (undated) DEVICE — SYRINGE BULB 2 OZ

## (undated) DEVICE — SYRINGE 10ML LL

## (undated) DEVICE — ANTIBACTERIAL VIOLET BRAIDED (POLYGLACTIN 910), SYNTHETIC ABSORBABLE SUTURE: Brand: COATED VICRYL

## (undated) DEVICE — INTENDED FOR TISSUE SEPARATION, AND OTHER PROCEDURES THAT REQUIRE A SHARP SURGICAL BLADE TO PUNCTURE OR CUT.: Brand: BARD-PARKER SAFETY BLADES SIZE 15, STERILE

## (undated) DEVICE — SPECIMEN CONTAINER STERILE PEEL PACK

## (undated) DEVICE — SYRINGE 20ML LL

## (undated) DEVICE — GLOVE INDICATOR PI UNDERGLOVE SZ 8 BLUE

## (undated) DEVICE — PACKING VAGINAL 2 IN

## (undated) DEVICE — DRAPE TOWEL: Brand: CONVERTORS

## (undated) DEVICE — URIMETER 2500ML

## (undated) DEVICE — TUBING SUCTION 5MM X 12 FT